# Patient Record
Sex: FEMALE | Race: WHITE | NOT HISPANIC OR LATINO | Employment: OTHER | URBAN - METROPOLITAN AREA
[De-identification: names, ages, dates, MRNs, and addresses within clinical notes are randomized per-mention and may not be internally consistent; named-entity substitution may affect disease eponyms.]

---

## 2017-08-28 ENCOUNTER — TRANSFERRED RECORDS (OUTPATIENT)
Dept: HEALTH INFORMATION MANAGEMENT | Facility: CLINIC | Age: 62
End: 2017-08-28

## 2019-10-18 ENCOUNTER — TRANSFERRED RECORDS (OUTPATIENT)
Dept: HEALTH INFORMATION MANAGEMENT | Facility: CLINIC | Age: 64
End: 2019-10-18

## 2019-10-21 ENCOUNTER — TRANSFERRED RECORDS (OUTPATIENT)
Dept: HEALTH INFORMATION MANAGEMENT | Facility: CLINIC | Age: 64
End: 2019-10-21

## 2019-11-08 ENCOUNTER — TELEPHONE (OUTPATIENT)
Dept: DERMATOLOGY | Facility: CLINIC | Age: 64
End: 2019-11-08

## 2019-11-08 NOTE — TELEPHONE ENCOUNTER
Pt scheduled for Mohs on 11/11 - biopsy records not received yet. Tried to contact the clinic in [place] @ [phone] and it was closed. Derm RN said to have pt keep her appt on 11/11. - Pt advised and appreciative    Jennifer Behrendt  Specialty CSS

## 2019-11-11 ENCOUNTER — OFFICE VISIT (OUTPATIENT)
Dept: DERMATOLOGY | Facility: CLINIC | Age: 64
End: 2019-11-11
Payer: COMMERCIAL

## 2019-11-11 VITALS — DIASTOLIC BLOOD PRESSURE: 65 MMHG | SYSTOLIC BLOOD PRESSURE: 113 MMHG | HEART RATE: 63 BPM | OXYGEN SATURATION: 98 %

## 2019-11-11 DIAGNOSIS — D22.9 NEVUS: ICD-10-CM

## 2019-11-11 DIAGNOSIS — D18.01 ANGIOMA OF SKIN: ICD-10-CM

## 2019-11-11 DIAGNOSIS — D03.59: ICD-10-CM

## 2019-11-11 DIAGNOSIS — L81.4 LENTIGO: Primary | ICD-10-CM

## 2019-11-11 DIAGNOSIS — L82.1 SEBORRHEIC KERATOSIS: ICD-10-CM

## 2019-11-11 PROCEDURE — 13132 CMPLX RPR F/C/C/M/N/AX/G/H/F: CPT | Mod: 51 | Performed by: DERMATOLOGY

## 2019-11-11 PROCEDURE — 88341 IMHCHEM/IMCYTCHM EA ADD ANTB: CPT | Performed by: DERMATOLOGY

## 2019-11-11 PROCEDURE — 17313 MOHS 1 STAGE T/A/L: CPT | Performed by: DERMATOLOGY

## 2019-11-11 PROCEDURE — 99203 OFFICE O/P NEW LOW 30 MIN: CPT | Mod: 25 | Performed by: DERMATOLOGY

## 2019-11-11 PROCEDURE — 88342 IMHCHEM/IMCYTCHM 1ST ANTB: CPT | Mod: 59 | Performed by: DERMATOLOGY

## 2019-11-11 PROCEDURE — 88305 TISSUE EXAM BY PATHOLOGIST: CPT | Mod: TC | Performed by: DERMATOLOGY

## 2019-11-11 NOTE — PROGRESS NOTES
Yordan Enamorado , a 64 year old year old female patient, I was asked to see by Vansant Deepa [facility] for melanoma in situ o nleft buttocks.  Patient states this has been present for one year.  Patient reports the following symptoms:  changing .  Patient reports the following previous treatments none.  Patient reports the following modifying factors none.  Associated symptoms: none.  Patient has no other skin complaints today.  Remainder of the HPI, Meds, PMH, Allergies, FH, and SH was reviewed in chart.      Past Medical History:   Diagnosis Date     Malignant melanoma (H)        History reviewed. No pertinent surgical history.     Family History   Problem Relation Age of Onset     Melanoma No family hx of        Social History     Socioeconomic History     Marital status:      Spouse name: Not on file     Number of children: Not on file     Years of education: Not on file     Highest education level: Not on file   Occupational History     Not on file   Social Needs     Financial resource strain: Not on file     Food insecurity:     Worry: Not on file     Inability: Not on file     Transportation needs:     Medical: Not on file     Non-medical: Not on file   Tobacco Use     Smoking status: Never Smoker     Smokeless tobacco: Never Used   Substance and Sexual Activity     Alcohol use: Not on file     Drug use: Not on file     Sexual activity: Not on file   Lifestyle     Physical activity:     Days per week: Not on file     Minutes per session: Not on file     Stress: Not on file   Relationships     Social connections:     Talks on phone: Not on file     Gets together: Not on file     Attends Oriental orthodox service: Not on file     Active member of club or organization: Not on file     Attends meetings of clubs or organizations: Not on file     Relationship status: Not on file     Intimate partner violence:     Fear of current or ex partner: Not on file     Emotionally abused: Not on file     Physically abused: Not  on file     Forced sexual activity: Not on file   Other Topics Concern     Not on file   Social History Narrative     Not on file       No outpatient encounter medications on file as of 11/11/2019.     No facility-administered encounter medications on file as of 11/11/2019.              Review Of Systems  Skin: As above  Eyes: negative  Ears/Nose/Throat: negative  Respiratory: No shortness of breath, dyspnea on exertion, cough, or hemoptysis  Cardiovascular: negative  Gastrointestinal: negative  Genitourinary: negative  Musculoskeletal: negative  Neurologic: negative  Psychiatric: negative  Hematologic/Lymphatic/Immunologic: negative  Endocrine: negative      O:   NAD, WDWN, Alert & Oriented, Mood & Affect wnl, Vitals stable   Here today alone   /65   Pulse 63   SpO2 98%    General appearance richa ii   Vitals stablke   Alert, oriented and in no acute distress      Following lymph nodes palpated: Occipital, Cervical, Supraclavicular , ingunal no lad   L buttocks 1cm red brown patch    Pigmented macule son trunk and ext with regula obrders and pigment networks    Stuck on papules and brown macules on trunk and ext    Red papules on trunk   Flesh colored papules on trunk      The remainder of the full exam was unremarkable; the following areas were examined:  conjunctiva/lids, oral mucosa, neck, peripheral vascular system, abdomen, lymph nodes, digits/nails, eccrine and apocrine glands, scalp/hair, face, neck, chest, abdomen, buttocks, back, RUE, LUE, RLE, LLE       Eyes: Conjunctivae/lids:Normal     ENT: Lips, buccal mucosa, tongue: normal    MSK:Normal    Cardiovascular: peripheral edema none    Pulm: Breathing Normal    Lymph Nodes: No Head and Neck Lymphadenopathy     Neuro/Psych: Orientation:Normal; Mood/Affect:Normal      A/P:  1. Seborrheic keratosis, lentigo, angioma, dermla nevus, nevi  2. Melanoma ins itu buttocks  MELANOMA DISCUSSED WITH PATIENT:  I discussed the specifics of tumor, prognosis,  metachronous melanoma, self exam, and genetics with the patient. I explained the need for monthly skin exams including and taught the patient how to do this. Patient was asked about new or changing moles . I discussed with patient signs and symptoms that could arise in the setting of recurrent locoregional or metastatic disease. In addition, the need to undergo every 4 month dermatologic full skin survey and evaluation given that patients with a diagnosis of melanoma are at risk of recurrence (local and distant) and of subsequent de hema melanoma.    BENIGN LESIONS DISCUSSED WITH PATIENT:  I discussed the specifics of tumor, prognosis, and genetics of benign lesions.  I explained that treatment of these lesions would be purely cosmetic and not medically neccessary.  I discussed with patient different removal options including excision, cautery and /or laser.      Nature and genetics of benign skin lesions dicussed with patient.  Signs and Symptoms of skin cancer discussed with patient.  ABCDEs of melanoma reviewed with patient.  Patient encouraged to perform monthly skin exams.  UV precautions reviewed with patient.    Skin care regimen reviewed with patient: Eliminate harsh soaps, i.e. Dial, zest, irsih spring; Mild soaps such as Cetaphil or Dove sensitive skin, avoid hot or cold showers, aggressive use of emollients including vanicream, cetaphil or cerave discussed with patient.    Risks of non-melanoma skin cancer discussed with patient   Return to clinic 6 months    PROCEDURE NOTE    l buttocks melanoma insitu   Debulking sent for perms   MELANOMA DISCUSSED WITH PATIENT:  I discussed the specifics of tumor, prognosis, metachronous melanoma, self exam, and genetics with the patient. I explained the need for monthly skin exams including and taught the patient how to do this. Patient was asked about new or changing moles . I discussed with patient signs and symptoms that could arise in the setting of recurrent  locoregional or metastatic disease. In addition, the need to undergo every 4 month dermatologic full skin survey and evaluation given that patients with a diagnosis of melanoma are at risk of recurrence (local and distant) and of subsequent de hema melanoma.  . I reviewed treatment options, including a discussion of wide excision (the gold standard) versus Mohs surgery with MART-1 immunostains.     Note: MART-1 (Melanoma Antigen Recognized by T-cells) antibody immunostaining was used during Mohs surgery as per standard protocol, in addition to routine processing of all specimens with hematoxylin and eosin. The peripheral margins/edges were processed with the MART-1 stain (2 specimens total). The center was examined with hematoxylin & eosin and MART-1 immunostains. The patient was informed of the procedure and its risk/benefits during the consent for the procedure.    One or more of the reagents used in immunohistochemical testing in this case may not have been cleared or approved by the U.S. Food and Drug Administration (FDA). The FDA has determined that such clearance or approval is not necessary. These tests are used for clinical purposes. They should not be regarded as investigational or for research. These reagents  performance characteristics have been determined by [facility]. This laboratory is certified under the Clinical Laboratory Improvement Amendments of 1988 (CLIA-88) as qualified to perform high complexity clinical laboratory testing.    [facility], decision for Mohs surgery was made. Indication for Mohs was aggressive histology. Patient confirmed the site with Dr. Arellano. After anesthesia with LEC, the tumor was excised using standard Mohs technique in 1 stages(s).  MART 1 stains were performed on 2 specimens. CLEAR MARGINS OBTAINED and Final defect size was 1.6 x 2 cm.   REPAIR COMPLEX: Because of the tightness of the surrounding skin and Because of the size  and full thickness nature of the defect, a complex closure was planned. After LEC anesthesia and prep, Burow's triangles were excised in the relaxed skin tension lines. The wound edges were widely undermined by dissection in the subcutaneous plane until adequate tissue mobility was obtained. Hemostasis was obtained. The wound edges were closed in a layered fashion using Vicryl and Fast Absorbing Plain Gut sutures. Postoperative length was 4.3 cm.   EBL minimal; complications none; wound care routine.  The patient was discharged in good condition and will return in one week for wound evaluation.

## 2019-11-11 NOTE — LETTER
11/11/2019         RE: Yordan Enamorado  [address] [address]        Dear Colleague,    Thank you for referring your patient, Yordan Enamorado, to the [facility]. Please see a copy of my visit note below.    Surgical Office Location :   [facility]  [address] [place] [address]      Yordan Enamorado , a 64 year old year old female patient, I was asked to see by [facility] for melanoma in situ o nleft buttocks.  Patient states this has been present for one year.  Patient reports the following symptoms:  changing .  Patient reports the following previous treatments none.  Patient reports the following modifying factors none.  Associated symptoms: none.  Patient has no other skin complaints today.  Remainder of the HPI, Meds, PMH, Allergies, FH, and SH was reviewed in chart.      Past Medical History:   Diagnosis Date     Malignant melanoma (H)        History reviewed. No pertinent surgical history.     Family History   Problem Relation Age of Onset     Melanoma No family hx of        Social History     Socioeconomic History     Marital status:      Spouse name: Not on file     Number of children: Not on file     Years of education: Not on file     Highest education level: Not on file   Occupational History     Not on file   Social Needs     Financial resource strain: Not on file     Food insecurity:     Worry: Not on file     Inability: Not on file     Transportation needs:     Medical: Not on file     Non-medical: Not on file   Tobacco Use     Smoking status: Never Smoker     Smokeless tobacco: Never Used   Substance and Sexual Activity     Alcohol use: Not on file     Drug use: Not on file     Sexual activity: Not on file   Lifestyle     Physical activity:     Days per week: Not on file     Minutes per session: Not on file     Stress: Not on file   Relationships     Social connections:     Talks on phone: Not on file     Gets together:  Not on file     Attends Taoist service: Not on file     Active member of club or organization: Not on file     Attends meetings of clubs or organizations: Not on file     Relationship status: Not on file     Intimate partner violence:     Fear of current or ex partner: Not on file     Emotionally abused: Not on file     Physically abused: Not on file     Forced sexual activity: Not on file   Other Topics Concern     Not on file   Social History Narrative     Not on file       No outpatient encounter medications on file as of 11/11/2019.     No facility-administered encounter medications on file as of 11/11/2019.              Review Of Systems  Skin: As above  Eyes: negative  Ears/Nose/Throat: negative  Respiratory: No shortness of breath, dyspnea on exertion, cough, or hemoptysis  Cardiovascular: negative  Gastrointestinal: negative  Genitourinary: negative  Musculoskeletal: negative  Neurologic: negative  Psychiatric: negative  Hematologic/Lymphatic/Immunologic: negative  Endocrine: negative      O:   NAD, WDWN, Alert & Oriented, Mood & Affect wnl, Vitals stable   Here today alone   /65   Pulse 63   SpO2 98%    General appearance richa ii   Vitals stablke   Alert, oriented and in no acute distress      Following lymph nodes palpated: Occipital, Cervical, Supraclavicular , ingunal no lad   L buttocks 1cm red brown patch    Pigmented macule son trunk and ext with regula obrders and pigment networks    Stuck on papules and brown macules on trunk and ext    Red papules on trunk   Flesh colored papules on trunk      The remainder of the full exam was unremarkable; the following areas were examined:  conjunctiva/lids, oral mucosa, neck, peripheral vascular system, abdomen, lymph nodes, digits/nails, eccrine and apocrine glands, scalp/hair, face, neck, chest, abdomen, buttocks, back, RUE, LUE, RLE, LLE       Eyes: Conjunctivae/lids:Normal     ENT: Lips, buccal mucosa, tongue:  normal    MSK:Normal    Cardiovascular: peripheral edema none    Pulm: Breathing Normal    Lymph Nodes: No Head and Neck Lymphadenopathy     Neuro/Psych: Orientation:Normal; Mood/Affect:Normal      A/P:  1. Seborrheic keratosis, lentigo, angioma, dermla nevus, nevi  2. Melanoma ins itu buttocks  MELANOMA DISCUSSED WITH PATIENT:  I discussed the specifics of tumor, prognosis, metachronous melanoma, self exam, and genetics with the patient. I explained the need for monthly skin exams including and taught the patient how to do this. Patient was asked about new or changing moles . I discussed with patient signs and symptoms that could arise in the setting of recurrent locoregional or metastatic disease. In addition, the need to undergo every 4 month dermatologic full skin survey and evaluation given that patients with a diagnosis of melanoma are at risk of recurrence (local and distant) and of subsequent de hema melanoma.    BENIGN LESIONS DISCUSSED WITH PATIENT:  I discussed the specifics of tumor, prognosis, and genetics of benign lesions.  I explained that treatment of these lesions would be purely cosmetic and not medically neccessary.  I discussed with patient different removal options including excision, cautery and /or laser.      Nature and genetics of benign skin lesions dicussed with patient.  Signs and Symptoms of skin cancer discussed with patient.  ABCDEs of melanoma reviewed with patient.  Patient encouraged to perform monthly skin exams.  UV precautions reviewed with patient.    Skin care regimen reviewed with patient: Eliminate harsh soaps, i.e. Dial, zest, irsih spring; Mild soaps such as Cetaphil or Dove sensitive skin, avoid hot or cold showers, aggressive use of emollients including vanicream, cetaphil or cerave discussed with patient.    Risks of non-melanoma skin cancer discussed with patient   Return to clinic 6 months    PROCEDURE NOTE    l buttocks melanoma insitu   Debulking sent for perms    MELANOMA DISCUSSED WITH PATIENT:  I discussed the specifics of tumor, prognosis, metachronous melanoma, self exam, and genetics with the patient. I explained the need for monthly skin exams including and taught the patient how to do this. Patient was asked about new or changing moles . I discussed with patient signs and symptoms that could arise in the setting of recurrent locoregional or metastatic disease. In addition, the need to undergo every 4 month dermatologic full skin survey and evaluation given that patients with a diagnosis of melanoma are at risk of recurrence (local and distant) and of subsequent de hema melanoma.  . I reviewed treatment options, including a discussion of wide excision (the gold standard) versus Mohs surgery with MART-1 immunostains.     Note: MART-1 (Melanoma Antigen Recognized by T-cells) antibody immunostaining was used during Mohs surgery as per standard protocol, in addition to routine processing of all specimens with hematoxylin and eosin. The peripheral margins/edges were processed with the MART-1 stain (2 specimens total). The center was examined with hematoxylin & eosin and MART-1 immunostains. The patient was informed of the procedure and its risk/benefits during the consent for the procedure.    One or more of the reagents used in immunohistochemical testing in this case may not have been cleared or approved by the U.S. Food and Drug Administration (FDA). The FDA has determined that such clearance or approval is not necessary. These tests are used for clinical purposes. They should not be regarded as investigational or for research. These reagents  performance characteristics have been determined by [facility]. This laboratory is certified under the Clinical Laboratory Improvement Amendments of 1988 (CLIA-88) as qualified to perform high complexity clinical laboratory testing.    After [facility] discussed with patient, decision for Mohs surgery was made.  Indication for Mohs was aggressive histology. Patient confirmed the site with Dr. Arellano. After anesthesia with LEC, the tumor was excised using standard Mohs technique in 1 stages(s).  MART 1 stains were performed on 2 specimens. CLEAR MARGINS OBTAINED and Final defect size was 1.6 x 2 cm.   REPAIR COMPLEX: Because of the tightness of the surrounding skin and Because of the size and full thickness nature of the defect, a complex closure was planned. After LEC anesthesia and prep, Burow's triangles were excised in the relaxed skin tension lines. The wound edges were widely undermined by dissection in the subcutaneous plane until adequate tissue mobility was obtained. Hemostasis was obtained. The wound edges were closed in a layered fashion using Vicryl and Fast Absorbing Plain Gut sutures. Postoperative length was 4.3 cm.   EBL minimal; complications none; wound care routine.  The patient was discharged in good condition and will return in one week for wound evaluation.      Again, thank you for allowing me to participate in the care of your patient.        Sincerely,        Kurt Arellano MD

## 2019-11-11 NOTE — NURSING NOTE
Initial /65   Pulse 63   SpO2 98%  There is no height or weight on file to calculate BMI. .    Eliza Stephens LPN

## 2019-11-11 NOTE — PATIENT INSTRUCTIONS
Sutured Wound Care     [facility]: [phone]    [facility]: [phone]          ? No strenuous activity for 48 hours. Resume moderate activity in 48 hours. No heavy exercising until you are seen for follow up in one week.     ? Take Tylenol as needed for discomfort.                         ? Do not drink alcoholic beverages for 48 hours.     ? Keep the pressure bandage in place for 24 hours. If the bandage becomes blood tinged or loose, reinforce it with gauze and tape.        (Refer to the reverse side of this page for management of bleeding).    ? Remove pressure bandage in 24 hours     ? Leave the flat bandage in place until your follow up appointment.    ? Keep the bandage dry. Wash around it carefully.    ? If the tape becomes soiled or starts to come off, reinforce it with additional paper tape.    ? Do not smoke for 3 weeks; smoking is detrimental to wound healing.    ? It is normal to have swelling and bruising around the surgical site. The bruising will fade in approximately 10-14 days. Elevate the area to reduce swelling.    ? Numbness, itchiness and sensitivity to temperature changes can occur after surgery and may take up to 18 months to normalize.      POSSIBLE COMPLICATIONS    BLEEDIN. Leave the bandage in place.  2. Use tightly rolled up gauze or a cloth to apply direct pressure over the bandage for 20   minutes.  3. Reapply pressure for an additional 20 minutes if necessary  4. Call the office or go to the nearest emergency room if pressure fails to stop the bleeding.  5. Use additional gauze and tape to maintain pressure once the bleeding has stopped.        PAIN:    1. Post operative pain should slowly get better, never worse.  2. A severe increase in pain may indicate a problem. Call the office if this occurs.    In case of emergency phone:Dr Arellano [phone]        WOUND CARE INSTRUCTIONS  for  ONE WEEK AFTER SURGERY       Change bandage on    blot with  water and apply steri strips and tape    1) Leave bandage on until 11/18  2) 11/25 when you remove the bandage, you may resume your regular skin care routine, including washing with mild soap and water, applying moisturizer, make-up and sunscreen.    3) If there are any open or bleeding areas at the incision/graft site you should begin to cover the area with a bandage daily as follows:    1) Clean and dry the area with plain tap water using a Q-tip or sterile gauze pad.  2) Apply Polysporin or Bacitracin ointment to the open area.  3) Cover the wound with a band-aid or a sterile non-stick gauze pad and micropore paper tape.         SIGNS OF INFECTION  - If you notice any of these signs of infection, call your doctor right away: expanding redness around the wound.  - Yellow or greenish-colored pus or cloudy wound drainage.    - Red streaking spreading from the wound.  - Increased swelling, tenderness, or pain around the wound.   - Fever.    Please remember that yellow and clear drainage from a wound can be normal and related to normal wound healing.  Isolated drainage from a wound without a combination of the above features does not indicate infection.       *Once the bandages are removed, the scar will be red and firm (especially in the lip/chin area). This is normal and will fade in time. It might take 6-12 months for this to happen.     *Massaging the area will help the scar soften and fade quicker. Begin to massage the area one month after the bandages have been removed. To massage apply pressure directly and firmly over the scar with the fingertips and move in a circular motion. Massage the area for a few minutes several times a day. Continue to massage the site for several months.    *Approximately 6-8 weeks after surgery it is not uncommon to see the formation of  tender pimple-like  bump along the scar. This is normal. As the scar continues to mature and the stitches underneath the skin begin to dissolve,  this might occur. Do not pick or squeeze, this will resolve on it s own. Should one break open producing a small amount of drainage, apply Polysporin or Bacitracin ointment a few times a day until the wound is completely healed.    *Numbness in the surgical area is expected. It might take 12-18 months for the feeling to return to normal. During this time sensations of itchiness, tingling and occasional sharp pains might be noted. These feelings are normal and will subside once the nerves have completely healed.         IN CASE OF EMERGENCY: Dr Arellano [phone]     If you were seen in [place] call: [phone]    If you were seen in [place] call: [phone]

## 2019-11-14 LAB — COPATH REPORT: NORMAL

## 2021-08-30 ENCOUNTER — TELEPHONE (OUTPATIENT)
Dept: DERMATOLOGY | Facility: CLINIC | Age: 66
End: 2021-08-30

## 2021-08-30 NOTE — TELEPHONE ENCOUNTER
Letter sent out to patient's home re: cancelled appt 10/14/2021 with Dr. Kurt Arellano. Asking patient to call clinic to reschedule.    Thank you  Shannan Alfred

## 2021-10-11 ENCOUNTER — ANCILLARY PROCEDURE (OUTPATIENT)
Dept: MAMMOGRAPHY | Facility: CLINIC | Age: 66
End: 2021-10-11
Attending: FAMILY MEDICINE
Payer: COMMERCIAL

## 2021-10-11 ENCOUNTER — OFFICE VISIT (OUTPATIENT)
Dept: FAMILY MEDICINE | Facility: CLINIC | Age: 66
End: 2021-10-11
Payer: COMMERCIAL

## 2021-10-11 VITALS
RESPIRATION RATE: 16 BRPM | DIASTOLIC BLOOD PRESSURE: 65 MMHG | BODY MASS INDEX: 20.09 KG/M2 | SYSTOLIC BLOOD PRESSURE: 119 MMHG | WEIGHT: 125 LBS | HEART RATE: 45 BPM | TEMPERATURE: 96.5 F | HEIGHT: 66 IN | OXYGEN SATURATION: 100 %

## 2021-10-11 DIAGNOSIS — Z78.0 ASYMPTOMATIC MENOPAUSAL STATE: ICD-10-CM

## 2021-10-11 DIAGNOSIS — Z12.11 COLON CANCER SCREENING: ICD-10-CM

## 2021-10-11 DIAGNOSIS — L85.3 DRY SKIN: ICD-10-CM

## 2021-10-11 DIAGNOSIS — J34.89 NASAL SORE: ICD-10-CM

## 2021-10-11 DIAGNOSIS — Z12.31 ENCOUNTER FOR SCREENING MAMMOGRAM FOR MALIGNANT NEOPLASM OF BREAST: ICD-10-CM

## 2021-10-11 DIAGNOSIS — G25.9 ABNORMAL LEG MOVEMENT: ICD-10-CM

## 2021-10-11 DIAGNOSIS — R19.7 DIARRHEA OF PRESUMED INFECTIOUS ORIGIN: ICD-10-CM

## 2021-10-11 DIAGNOSIS — Z00.00 MEDICARE ANNUAL WELLNESS VISIT, SUBSEQUENT: Primary | ICD-10-CM

## 2021-10-11 DIAGNOSIS — L91.0 KELOID SCAR: ICD-10-CM

## 2021-10-11 DIAGNOSIS — K13.79 MOUTH SORE: ICD-10-CM

## 2021-10-11 LAB
ALBUMIN SERPL-MCNC: 4 G/DL (ref 3.5–5)
ALP SERPL-CCNC: 65 U/L (ref 45–120)
ALT SERPL W P-5'-P-CCNC: 17 U/L (ref 0–45)
ANION GAP SERPL CALCULATED.3IONS-SCNC: 10 MMOL/L (ref 5–18)
AST SERPL W P-5'-P-CCNC: 22 U/L (ref 0–40)
BASOPHILS # BLD AUTO: 0 10E3/UL (ref 0–0.2)
BASOPHILS NFR BLD AUTO: 1 %
BILIRUB SERPL-MCNC: 0.5 MG/DL (ref 0–1)
BUN SERPL-MCNC: 12 MG/DL (ref 8–22)
CALCIUM SERPL-MCNC: 9.9 MG/DL (ref 8.5–10.5)
CHLORIDE BLD-SCNC: 103 MMOL/L (ref 98–107)
CO2 SERPL-SCNC: 28 MMOL/L (ref 22–31)
CREAT SERPL-MCNC: 0.79 MG/DL (ref 0.6–1.1)
EOSINOPHIL # BLD AUTO: 0.2 10E3/UL (ref 0–0.7)
EOSINOPHIL NFR BLD AUTO: 3 %
ERYTHROCYTE [DISTWIDTH] IN BLOOD BY AUTOMATED COUNT: 12.4 % (ref 10–15)
FERRITIN SERPL-MCNC: 81 NG/ML (ref 10–130)
GFR SERPL CREATININE-BSD FRML MDRD: 79 ML/MIN/1.73M2
GLUCOSE BLD-MCNC: 88 MG/DL (ref 70–125)
HCT VFR BLD AUTO: 39.4 % (ref 35–47)
HGB BLD-MCNC: 12.6 G/DL (ref 11.7–15.7)
IMM GRANULOCYTES # BLD: 0 10E3/UL
IMM GRANULOCYTES NFR BLD: 0 %
LYMPHOCYTES # BLD AUTO: 1.4 10E3/UL (ref 0.8–5.3)
LYMPHOCYTES NFR BLD AUTO: 19 %
MAGNESIUM SERPL-MCNC: 2.1 MG/DL (ref 1.8–2.6)
MCH RBC QN AUTO: 31.5 PG (ref 26.5–33)
MCHC RBC AUTO-ENTMCNC: 32 G/DL (ref 31.5–36.5)
MCV RBC AUTO: 99 FL (ref 78–100)
MONOCYTES # BLD AUTO: 0.6 10E3/UL (ref 0–1.3)
MONOCYTES NFR BLD AUTO: 8 %
NEUTROPHILS # BLD AUTO: 4.9 10E3/UL (ref 1.6–8.3)
NEUTROPHILS NFR BLD AUTO: 68 %
PLATELET # BLD AUTO: 218 10E3/UL (ref 150–450)
POTASSIUM BLD-SCNC: 4.4 MMOL/L (ref 3.5–5)
PROT SERPL-MCNC: 7.6 G/DL (ref 6–8)
RBC # BLD AUTO: 4 10E6/UL (ref 3.8–5.2)
SODIUM SERPL-SCNC: 141 MMOL/L (ref 136–145)
TSH SERPL DL<=0.005 MIU/L-ACNC: 1.77 UIU/ML (ref 0.3–5)
VIT B12 SERPL-MCNC: 928 PG/ML (ref 213–816)
WBC # BLD AUTO: 7.2 10E3/UL (ref 4–11)

## 2021-10-11 PROCEDURE — 83735 ASSAY OF MAGNESIUM: CPT | Performed by: FAMILY MEDICINE

## 2021-10-11 PROCEDURE — 82607 VITAMIN B-12: CPT | Performed by: FAMILY MEDICINE

## 2021-10-11 PROCEDURE — 99213 OFFICE O/P EST LOW 20 MIN: CPT | Mod: 25 | Performed by: FAMILY MEDICINE

## 2021-10-11 PROCEDURE — 77067 SCR MAMMO BI INCL CAD: CPT | Mod: TC | Performed by: RADIOLOGY

## 2021-10-11 PROCEDURE — 87186 SC STD MICRODIL/AGAR DIL: CPT | Performed by: FAMILY MEDICINE

## 2021-10-11 PROCEDURE — 82306 VITAMIN D 25 HYDROXY: CPT | Performed by: FAMILY MEDICINE

## 2021-10-11 PROCEDURE — 36415 COLL VENOUS BLD VENIPUNCTURE: CPT | Performed by: FAMILY MEDICINE

## 2021-10-11 PROCEDURE — 87070 CULTURE OTHR SPECIMN AEROBIC: CPT | Performed by: FAMILY MEDICINE

## 2021-10-11 PROCEDURE — 99397 PER PM REEVAL EST PAT 65+ YR: CPT | Performed by: FAMILY MEDICINE

## 2021-10-11 PROCEDURE — 82728 ASSAY OF FERRITIN: CPT | Performed by: FAMILY MEDICINE

## 2021-10-11 PROCEDURE — 80050 GENERAL HEALTH PANEL: CPT | Performed by: FAMILY MEDICINE

## 2021-10-11 PROCEDURE — 87077 CULTURE AEROBIC IDENTIFY: CPT | Performed by: FAMILY MEDICINE

## 2021-10-11 RX ORDER — MUPIROCIN 20 MG/G
OINTMENT TOPICAL 3 TIMES DAILY
Qty: 30 G | Refills: 0 | Status: SHIPPED | OUTPATIENT
Start: 2021-10-11 | End: 2023-04-24

## 2021-10-11 ASSESSMENT — ACTIVITIES OF DAILY LIVING (ADL): CURRENT_FUNCTION: NO ASSISTANCE NEEDED

## 2021-10-11 ASSESSMENT — MIFFLIN-ST. JEOR: SCORE: 1124.75

## 2021-10-11 NOTE — PATIENT INSTRUCTIONS
astroglide for lubrication    Try antibiotic ointment to nostrils bid for 14 days      Patient Education   Personalized Prevention Plan  You are due for the preventive services outlined below.  Your care team is available to assist you in scheduling these services.  If you have already completed any of these items, please share that information with your care team to update in your medical record.  There are no preventive care reminders to display for this patient.    Signs of Hearing Loss      Hearing much better with one ear can be a sign of hearing loss.   Hearing loss is a problem shared by many people. In fact, it is one of the most common health problems, particularly as people age. Most people age 65 and older have some hearing loss. By age 80, almost everyone does. Hearing loss often occurs slowly over the years. So you may not realize your hearing has gotten worse.  Have your hearing checked  Call your healthcare provider if you:    Have to strain to hear normal conversation    Have to watch other people s faces very carefully to follow what they re saying    Need to ask people to repeat what they ve said    Often misunderstand what people are saying    Turn the volume of the television or radio up so high that others complain    Feel that people are mumbling when they re talking to you    Find that the effort to hear leaves you feeling tired and irritated    Notice, when using the phone, that you hear better with one ear than the other  [employer] last reviewed this educational content on 1/1/2020 2000-2021 [employer]. All rights reserved. This information is not intended as a substitute for professional medical care. Always follow your healthcare professional's instructions.

## 2021-10-11 NOTE — PROGRESS NOTES
SUBJECTIVE:   Yordan Enamorado is a 65 year old female who presents for Preventive Visit.    [facility] Health Maintenance Exam  [facility]      Assessment/Plan     1. Annual Wellness Visit as outlined below.   Health maintenance discussed as appropriate for age and risk factors including:  Nutrition, exercise, alcohol use, tobacco use, safe sexual practices, dental health.  Cancer screening assessed and ordered as indicated. Routine labs as outlined below based on age and risk factors reviewed today. Immunizations reviewed and updated.       Medicare annual wellness visit, subsequent  - Adult Eye Referral    Nasal sore  Culture first and trial of mupirocin for 2 weeks.  If not better consider ENT consultation.    - CBC with platelets and differential  - mupirocin (BACTROBAN) 2 % external ointment  Dispense: 30 g; Refill: 0  - Skin Aerobic Bacterial Culture Routine  - CBC with platelets and differential    Mouth sore  Stable for 6 months.  Check nutritional status below.  Consider a b complex.  Follow-up with dentist vs ENT.      Abnormal leg movement  Possibly RLS.  Check nutritional deficiencies below.  Reviewed hydration and gentle exercise/stretching before bed.  Consider consultation with sleep clinic vs neurology next.    - CBC with platelets and differential  - Ferritin  - Vitamin B12  - Comprehensive metabolic panel (BMP + Alb, Alk Phos, ALT, AST, Total. Bili, TP)  - Magnesium  - CBC with platelets and differential  - Ferritin  - Vitamin B12  - Comprehensive metabolic panel (BMP + Alb, Alk Phos, ALT, AST, Total. Bili, TP)  - Magnesium    Asymptomatic menopausal state  - Vitamin D Deficiency  - DX Hip/Pelvis/Spine  - Vitamin D Deficiency    Colon cancer screening  - COLOGUARD(EXACT SCIENCES)    Dry skin  - TSH with free T4 reflex  - TSH with free T4 reflex    Diarrhea of presumed infectious origin  - Ova and Parasite Exam Routine  - Ova and Parasite Exam Routine  - Ova and Parasite Exam  Routine    Encounter for screening mammogram for malignant neoplasm of breast  - *MA Screening Digital Bilateral    Keloid scar  Follow-up with derm as desired.         Return in about 53 weeks (around 10/17/2022) for Annual Wellness Visit.    HPI:     Chief Complaint   Patient presents with     Physical       Yordan MARVIN Enamorado is a 65 year old female and is here for a comprehensive health maintenance exam.     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 At first thought it was peanut allergies it got better off peanuts but then came back again.  Vaseline seems to help short term.  Has had this for years.  At [place] told that it could be cleared up but it would return.  Has never been swabbed/cultured before.      Sore in mouth- thinks it is from biting the mouth and never healing.  Has been there about 6 months now.  Slowly getting better but never goes away.      RLS- better with bananas.  Legs are constantly moving and makes it hard to sleep.  If she doesn't move her legs she feels like she is going to loose it. Doesn't get much dairy in diet.  Does take D regularly.  Does take b12 at times.      Dry skin - has had a difficulty with this her entire life.  Often itches to the point of bleeding.  Can't shave her legs.  Takes cool showers.  Has a good cream to use.      Worried about amoebas/worms based on recent travel and living situation.  Has had 2 bouts of diarrhea x2 in past several months.  Has had ameobas in the past and had diarrhea on and off.  Last deworming was 10 years ago.  Tends to eat adventurously.      H/o melanoma and has follow-up with dermatology scheduled.  Scar is itchy and menezes.  No radiation or topical chemo    sinovac covid shots 4/28/21 and 5/26/21   Declines flu shot     History summarized from1-2:na  Old Records-1: Outside  "allergies, meds, problems and immunizations were reconciled as needed from [facility]where  Requested records from [facility] in [place]   Radiology tests reviewed-1: na  Lab tests reviewed-1: na  Medicine tests reviewed-1: na    Review of Systems   Complete ROS including General, HEENT, CV, Pulmonary, GI, , Genital, Neuro, Psych, MSK, Heme, Endocrine all within normal except as noted in the HPI.      Prevention of Osteoporosis:limited dairy   Last Dexa:due     Cancer Screening:  Hemoccults/Colonoscopy: cologard    Mammogram: due today  Pap Smear:  NA  Lung Cancer: na      Wt Readings from Last 3 Encounters:   10/11/21 56.7 kg (125 lb)         Patient has been advised of split billing requirements and indicates understanding: Yes   Are you in the first 12 months of your Medicare coverage?  Yes,  Visual Acuity:  Right Eye: 20/40   Left Eye: 20/50      Healthy Habits:     In general, how would you rate your overall health?  Good    Frequency of exercise:  6-7 days/week    Duration of exercise:  45-60 minutes    Do you usually eat at least 4 servings of fruit and vegetables a day, include whole grains    & fiber and avoid regularly eating high fat or \"junk\" foods?  Yes    Taking medications regularly:  Yes    Medication side effects:  None    Ability to successfully perform activities of daily living:  No assistance needed    Home Safety:  No safety concerns identified    Hearing Impairment:  Feel that people are mumbling or not speaking clearly    In the past 6 months, have you been bothered by leaking of urine?  No    In general, how would you rate your overall mental or emotional health?  Good      PHQ-2 Total Score: 0    Additional concerns today:  Yes    Do you feel safe in your environment? YES    Have you ever done Advance Care Planning? (For example, a Health Directive, POLST, or a discussion with a medical provider or your loved ones about your wishes): No, advance care planning information given to patient to " review.  Patient plans to discuss their wishes with loved ones or provider.       Fall risk  Fallen 2 or more times in the past year?: No  Any fall with injury in the past year?: No    Cognitive Screening   1) Repeat 3 items (Leader, Season, Table)    2) Clock draw: NORMAL  3) 3 item recall: Recalls 3 objects  Results: 3 items recalled: COGNITIVE IMPAIRMENT LESS LIKELY    Mini-CogTM Copyright ROXI Kaufman. Licensed by the author for use in [facility]; reprinted with permission (gena@[facility]). All rights reserved.      Do you have sleep apnea, excessive snoring or daytime drowsiness?: no    Reviewed and updated as needed this visit by clinical staff  Tobacco  Allergies  Meds  Problems  Med Hx  Surg Hx  Fam Hx  Soc Hx          Reviewed and updated as needed this visit by Provider  Tobacco   Meds  Problems  Med Hx  Surg Hx  Fam Hx  Soc Hx       Social History     Tobacco Use     Smoking status: Never Smoker     Smokeless tobacco: Never Used   Substance Use Topics     Alcohol use: Never         Alcohol Use 10/11/2021   Prescreen: >3 drinks/day or >7 drinks/week? No       Current providers sharing in care for this patient include:   Patient Care Team:  Malathi Singh MD as PCP - General (Family Medicine)  No Ref-Primary, Physician    The following health maintenance items are reviewed in Epic and correct as of today:  Health Maintenance Due   Topic Date Due     ANNUAL REVIEW OF  ORDERS  Never done     ADVANCE CARE PLANNING  Never done     COLORECTAL CANCER SCREENING  Never done     COVID-19 Vaccine (1) Never done     HIV SCREENING  Never done     HEPATITIS C SCREENING  Never done     DTAP/TDAP/TD IMMUNIZATION (1 - Tdap) Never done     LIPID  Never done     ZOSTER IMMUNIZATION (1 of 2) Never done     Pneumococcal Vaccine: 65+ Years (1 of 1 - PPSV23) Never done     INFLUENZA VACCINE (1) Never done         Any new diagnosis of family breast, ovarian, or bowel cancer? no    FHS-7:   Breast CA Risk  "Assessment (FHS-7) 10/11/2021   Did any of your first-degree relatives have breast or ovarian cancer? No   Did any of your relatives have bilateral breast cancer? Unknown   Did any man in your family have breast cancer? No   Did any woman in your family have breast and ovarian cancer? No   Did any woman in your family have breast cancer before age 50 y? No   Do you have 2 or more relatives with breast and/or ovarian cancer? No   Do you have 2 or more relatives with breast and/or bowel cancer? No       Pertinent mammograms are reviewed under the imaging tab.    Review of Systems      OBJECTIVE:   /65 (BP Location: Right arm, Patient Position: Sitting, Cuff Size: Adult Regular)   Pulse (!) 45   Temp (!) 96.5  F (35.8  C) (Temporal)   Resp 16   Ht 1.67 m (5' 5.75\")   Wt 56.7 kg (125 lb)   SpO2 100%   BMI 20.33 kg/m   Estimated body mass index is 20.33 kg/m  as calculated from the following:    Height as of this encounter: 1.67 m (5' 5.75\").    Weight as of this encounter: 56.7 kg (125 lb).  Physical Exam  All normal as below except abnormalities include: crusting in both nostril- no bleeding or redness/swelling,   General is a  65 year old female sitting comfortably in no apparent distress.   HEENT:  TM are clear bilaterally.  Eye, nasal, oral exams within normal   Neck: Supple without lymphadenopathy or thyromegally  CV: Regular rate and rhythm S1S2 without rubs, murmurs or gallops,   Lungs: Clear to auscultation bilaterally  Abd:  +BS, soft NT/ND,  No masses or organomegally,   Extremities: Warm, No Edema, 2+ Pedal and radial pulses bilaterally  Skin: No lesions or rashes noted  Neuro: Able to ambulate around the exam room with equal movement, strength and normal coordination of the upper and lower extremeties symmetrically      ASSESSMENT / PLAN:       Patient has been advised of split billing requirements and indicates understanding: yes  COUNSELING:      Estimated body mass index is 20.33 kg/m  as " "calculated from the following:    Height as of this encounter: 1.67 m (5' 5.75\").    Weight as of this encounter: 56.7 kg (125 lb).        She reports that she has never smoked. She has never used smokeless tobacco.      Appropriate preventive services were discussed with this patient, including applicable screening as appropriate for cardiovascular disease, diabetes, osteopenia/osteoporosis, and glaucoma.  As appropriate for age/gender, discussed screening for colorectal cancer, prostate cancer, breast cancer, and cervical cancer. Checklist reviewing preventive services available has been given to the patient.    Reviewed patients plan of care and provided an AVS. The Basic Care Plan (routine screening as documented in Health Maintenance) for Yordan meets the Care Plan requirement. This Care Plan has been established and reviewed with the Patient.    Counseling Resources:  ATP IV Guidelines  Pooled Cohorts Equation Calculator  Breast Cancer Risk Calculator  Breast Cancer: Medication to Reduce Risk  FRAX Risk Assessment  ICSI Preventive Guidelines  Dietary Guidelines for Americans, 2010  USDA's MyPlate  ASA Prophylaxis  Lung CA Screening    Malathi Singh MD  [facility]    Identified Health Risks:    The patient was provided with written information regarding signs of hearing loss.  "

## 2021-10-12 LAB — DEPRECATED CALCIDIOL+CALCIFEROL SERPL-MC: 33 UG/L (ref 30–80)

## 2021-10-13 LAB
BACTERIA SKIN AEROBE CULT: ABNORMAL
BACTERIA SKIN AEROBE CULT: ABNORMAL

## 2021-10-18 ENCOUNTER — TRANSFERRED RECORDS (OUTPATIENT)
Dept: HEALTH INFORMATION MANAGEMENT | Facility: CLINIC | Age: 66
End: 2021-10-18

## 2021-10-18 LAB — COLOGUARD-ABSTRACT: NEGATIVE

## 2021-10-20 ENCOUNTER — ANCILLARY PROCEDURE (OUTPATIENT)
Dept: BONE DENSITY | Facility: CLINIC | Age: 66
End: 2021-10-20
Attending: FAMILY MEDICINE
Payer: COMMERCIAL

## 2021-10-20 DIAGNOSIS — Z78.0 ASYMPTOMATIC MENOPAUSAL STATE: ICD-10-CM

## 2021-10-20 PROCEDURE — 77080 DXA BONE DENSITY AXIAL: CPT | Mod: TC | Performed by: RADIOLOGY

## 2021-10-21 PROBLEM — M85.89 OSTEOPENIA OF MULTIPLE SITES: Status: ACTIVE | Noted: 2021-10-21

## 2021-10-22 ENCOUNTER — E-VISIT (OUTPATIENT)
Dept: FAMILY MEDICINE | Facility: CLINIC | Age: 66
End: 2021-10-22
Payer: COMMERCIAL

## 2021-10-22 DIAGNOSIS — G25.81 RESTLESS LEGS SYNDROME (RLS): Primary | ICD-10-CM

## 2021-10-22 PROCEDURE — 99207 PR NON-BILLABLE SERV PER CHARTING: CPT | Performed by: FAMILY MEDICINE

## 2021-10-27 ENCOUNTER — MYC MEDICAL ADVICE (OUTPATIENT)
Dept: FAMILY MEDICINE | Facility: CLINIC | Age: 66
End: 2021-10-27

## 2021-10-27 PROCEDURE — 87177 OVA AND PARASITES SMEARS: CPT

## 2021-10-27 PROCEDURE — 87209 SMEAR COMPLEX STAIN: CPT

## 2021-10-28 ENCOUNTER — ANCILLARY PROCEDURE (OUTPATIENT)
Dept: MAMMOGRAPHY | Facility: CLINIC | Age: 66
End: 2021-10-28
Attending: FAMILY MEDICINE
Payer: COMMERCIAL

## 2021-10-28 DIAGNOSIS — N64.89 BREAST ASYMMETRY: ICD-10-CM

## 2021-10-28 PROCEDURE — 87177 OVA AND PARASITES SMEARS: CPT

## 2021-10-28 PROCEDURE — 87209 SMEAR COMPLEX STAIN: CPT

## 2021-10-28 PROCEDURE — 77061 BREAST TOMOSYNTHESIS UNI: CPT | Mod: RT

## 2021-10-29 ENCOUNTER — LAB (OUTPATIENT)
Dept: LAB | Facility: CLINIC | Age: 66
End: 2021-10-29
Payer: COMMERCIAL

## 2021-10-29 DIAGNOSIS — R19.7 DIARRHEA OF PRESUMED INFECTIOUS ORIGIN: ICD-10-CM

## 2021-10-29 PROCEDURE — 87209 SMEAR COMPLEX STAIN: CPT

## 2021-10-29 PROCEDURE — 87177 OVA AND PARASITES SMEARS: CPT

## 2021-11-01 ENCOUNTER — MYC MEDICAL ADVICE (OUTPATIENT)
Dept: FAMILY MEDICINE | Facility: CLINIC | Age: 66
End: 2021-11-01
Payer: MEDICARE

## 2021-11-01 DIAGNOSIS — Z23 IMMUNIZATION DUE: Primary | ICD-10-CM

## 2021-11-01 DIAGNOSIS — D03.9 MELANOMA IN SITU, UNSPECIFIED SITE (H): Primary | ICD-10-CM

## 2021-11-01 LAB
O+P STL MICRO: ABNORMAL

## 2021-11-01 NOTE — TELEPHONE ENCOUNTER
Derm referral made:  Diagnoses and all orders for this visit:    Melanoma in situ, unspecified site (H)  -     Adult Dermatology Referral; Future

## 2021-11-11 DIAGNOSIS — J34.89 NASAL SORE: Primary | ICD-10-CM

## 2021-11-18 ENCOUNTER — TELEPHONE (OUTPATIENT)
Dept: DERMATOLOGY | Facility: CLINIC | Age: 66
End: 2021-11-18
Payer: MEDICARE

## 2021-11-18 NOTE — TELEPHONE ENCOUNTER
[facility] Call Center    Phone Message    May a detailed message be left on voicemail: no VM, MyC is ok    Reason for Call: Appointment Intake    Referring Provider Name:   Pt of Dr Arellano    Diagnosis and/or Symptoms:   Skin Check    Action Taken: Other: [facility]    Travel Screening: Not Applicable     Pt is leaving [facility] on 12/15/21 and has unknown return. Pt wants to see Dr Arellano before leaving.    Scheduling let Pt know that no openings are available throughout system. Pt requests message to clinic to see if there is anything clinic can do to schedule.     Sending Duplicate  TE to [facility].    Please call Pt to advise. Thanks!

## 2021-11-18 NOTE — TELEPHONE ENCOUNTER
[facility] Call Center    Phone Message    May a detailed message be left on voicemail: no VM, MyC is ok    Reason for Call: Appointment Intake    Referring Provider Name:   Pt of Dr Arellano    Diagnosis and/or Symptoms:   Skin Check    Action Taken: Other: [facility]    Travel Screening: Not Applicable     Pt is leaving [facility] on 12/15/21 and has unknown return. Pt wants to see Dr Arellano before leaving.    Scheduling let Pt know that no openings are available throughout system. Pt requests message to clinic to see if there is anything clinic can do to schedule.     Sending Duplicate  TE to [facility].    Please call Pt to advise. Thanks!

## 2021-11-18 NOTE — TELEPHONE ENCOUNTER
Called and spoke to patient.    Informed patient no availability prior to scheduled appointment on 1/13/22.    RN added patient to wait list.    Patient voiced understanding.    Judith RN-BSN-PHN  [facility]  [phone]

## 2021-11-19 NOTE — TELEPHONE ENCOUNTER
Sinovac is an inactivated virus Covid-19 vaccine. She received her doses on 4/28/21 and 5/26/21.    I recommend: Moderna BOOSTER. Due 11/26/21 or later.  Diagnoses and all orders for this visit:    Immunization due  -     COVID-19,PF,MODERNA (18+ YRS BOOSTER .25ML); Future

## 2021-11-20 ENCOUNTER — HEALTH MAINTENANCE LETTER (OUTPATIENT)
Age: 66
End: 2021-11-20

## 2021-11-22 ENCOUNTER — OFFICE VISIT (OUTPATIENT)
Dept: DERMATOLOGY | Facility: CLINIC | Age: 66
End: 2021-11-22
Payer: COMMERCIAL

## 2021-11-22 DIAGNOSIS — L82.1 SEBORRHEIC KERATOSIS: ICD-10-CM

## 2021-11-22 DIAGNOSIS — L91.0 HYPERTROPHIC SCAR: Primary | ICD-10-CM

## 2021-11-22 DIAGNOSIS — L85.3 XEROSIS CUTIS: ICD-10-CM

## 2021-11-22 DIAGNOSIS — L57.8 SUN-DAMAGED SKIN: ICD-10-CM

## 2021-11-22 DIAGNOSIS — D03.9 MELANOMA IN SITU, UNSPECIFIED SITE (H): ICD-10-CM

## 2021-11-22 DIAGNOSIS — D22.9 MULTIPLE BENIGN NEVI: ICD-10-CM

## 2021-11-22 DIAGNOSIS — L81.4 SOLAR LENTIGO: ICD-10-CM

## 2021-11-22 DIAGNOSIS — D18.01 CHERRY ANGIOMA: ICD-10-CM

## 2021-11-22 PROCEDURE — 11900 INJECT SKIN LESIONS </W 7: CPT | Performed by: DERMATOLOGY

## 2021-11-22 PROCEDURE — 99213 OFFICE O/P EST LOW 20 MIN: CPT | Mod: 25 | Performed by: DERMATOLOGY

## 2021-11-22 RX ORDER — TRETINOIN 0.25 MG/G
CREAM TOPICAL
Qty: 45 G | Refills: 11 | Status: SHIPPED | OUTPATIENT
Start: 2021-11-22 | End: 2023-04-24

## 2021-11-22 RX ORDER — TRETINOIN 0.25 MG/G
CREAM TOPICAL
Status: CANCELLED | OUTPATIENT
Start: 2021-11-22

## 2021-11-22 NOTE — NURSING NOTE
Clinic Administered Medication Documentation    Administrations This Visit     triamcinolone acetonide (KENALOG-10) injection 10 mg     Admin Date  11/22/2021 Action  Given Dose  10 mg Route  Intra-Lesional Site   Administered By  Rosita Hernandez LPN    Ordering Provider: Paul Wade MD    Patient Supplied?: No              Rosita Hernandez LPN

## 2021-11-22 NOTE — PROGRESS NOTES
[facility] Dermatology Note  Encounter Date: Nov 22, 2021  Office Visit     Dermatology Problem List:  Last skin check 11/22/21, recommended yearly  1. MIS, left buttocks, s/p MMS 11/11/19 with Dr. Arellano  - ILK 10mg/mL 11/22/21  ____________________________________________    Assessment & Plan:    # MIS, left buttocks. No evidence of recurrence. Discussed risk of melanoma recurrence (with local or distant disease) and risk of additional primary melanomas. Explained routine schedule for follow up examinations in office and need for self skin checks monthly.  - ABCDs of melanoma were discussed and self skin checks were advised.   - Sun precaution was advised including the use of sunscreens of SPF 30 or higher, sun protective clothing, and avoidance of tanning beds.  - Recommended yearly dental, gyn, and ophthalmology examinations.  - Recommended first degree relatives get yearly skin exams as well.    # Hypertrophic / keloidal scar, L buttocks. In setting of prior melanoma surgery. No evidence of recurrence.   - Recommended ILK for hypertrophic scar on the left buttocks at previous MIS site. See ILK procedure note below.    # Benign lesions: Multiple benign nevi, solar lentigos, seborrheic keratoses, cherry angiomas. Explained to patient benign nature of lesion. No treatment is necessary at this time unless the lesion changes or becomes symptomatic.   - ABCDs of melanoma were discussed and self skin checks were advised.  - Sun precaution was advised including the use of sun screens of SPF 30 or higher, sun protective clothing, and avoidance of tanning beds.    # Xerosis cutis.  - Recommended soaking in tub (recipe given for bleach bath), pat dry, then follow with CeraVe moisturizer.   - Also discussed Curel hydrotherapy to apply when skin is wet.    # Solar lentigines, face.   - Prescribed tretinoin 0.025% cream. Apply a thin layer over the entire face, avoiding the eyelids. Moisturize at top.  Reviewed this may not be covered by insurance. Discussed GoodRx for coupon.  - Diligent use of sunscreen.     #Benign lesions: Multiple benign nevi, solar lentigos, seborrheic keratoses, cherry angiomas. Explained to patient benign nature of lesion. No treatment is necessary at this time unless the lesion changes or becomes symptomatic.     - ABCDs of melanoma were discussed and self skin checks were advised.  - Sun precaution was advised including the use of sun screens of SPF 30 or higher, sun protective clothing, and avoidance of tanning beds.     Procedures Performed:   - Intra-lesional triamcinolone procedure note. After positioning and cleansing with isopropyl alcohol, 0.4 total mL of triamcinolone 10 mg/mL was injected into 2 lesions on the left buttocks. The patient tolerated the procedure well and left the dermatology clinic in good condition.    Follow-up: 1 year in-person for skin check, or earlier for new or changing lesions.    Staff and Scribe:     Scribe Disclosure:   I, Jessica Edward, am serving as a scribe to document services personally performed by this physician, Dr. Paul Wade, based on data collection and the provider's statements to me.    Provider Disclosure:   The documentation recorded by the scribe accurately reflects the services I personally performed and the decisions made by me.    Paul Wade MD    Department of Dermatology  [facility]: Phone: [phone], Fax:[phone]  [facility]: Phone: [phone] Fax: [phone]  ____________________________________________    CC: Skin Check (Full body skin check. No spots of concern. Personal history of melanoma in [place]. No family history of skin cancer.)    HPI:  Ms. Yordan Enamorado is a(n) 66 year old female who presents today as a return patient for skin check.    Last seen 11/11/19 by Dr. Arellano. [facility]  was performed on an MIS on the left buttocks.    Referred by PCP for MIS on 11/1/21. Patient sent [facility] message requesting a referral for a dermatologist.    Today, Yordan notes the scar on the left buttocks is sore and itchy. She also is wondering about good moisturizers and treatment for dark spots on the face.    The patient otherwise denies any new or concerning lesions. No bleeding, painful, pruritic, or changing lesions. They report a history of MIS on the right buttocks. No other skin cancers. There is no family history of skin cancer. No history of immunosuppression. No history of indoor tanning. They do use sunscreen and protective clothing when outdoors for sun protection. Takes Vitamin D supplements. No occupational exposure to ultraviolet light or other forms of radiation. 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 Health otherwise stable. No other skin concerns.     Denies any fevers, chills, night sweats, or weight loss.    Labs Reviewed:  N/A    Physical Exam:  Vitals: There were no vitals taken for this visit.  SKIN: Total skin excluding the undergarment areas was performed. The exam included the head/face, neck, both arms, chest, back, abdomen, buttocks, both legs, digits and/or nails. Bra was worn for exam.  - Brown macules on sun exposed areas  - Brown waxy, stuck-on appearing papules on face, trunk, and extremities.   - Brown macules and papules on trunk and extremities without concerning dermoscopy features  - Red vascular papules on face, trunk and extremities.   - Firm, smooth, linear, hypertrophic scar on the left buttocks.  - No other lesions of concern on areas examined.   LYMPH NODES: No submandibular, cervical, supraclavicular, axillary, or inguinal lymphadenopathy palpable on examination.     Medications:  Current Outpatient Medications   Medication     tretinoin (RETIN-A) 0.025 % external cream     vitamin B complex with  vitamin C (VITAMIN  B COMPLEX) tablet     VITAMIN D PO     VITAMIN E PO     mupirocin (BACTROBAN) 2 % external ointment     Current Facility-Administered Medications   Medication     triamcinolone acetonide (KENALOG-10) injection 10 mg      Past Medical History:   Patient Active Problem List   Diagnosis     Nasal sore     Mouth sore     Abnormal leg movement     Keloid scar     Osteopenia of multiple sites     Past Medical History:   Diagnosis Date     Malignant melanoma (H)      Melanoma of skin (H)     left buttocks        CC Rina Joiner MD  [address] [place], [place] [phone] on close of this encounter.

## 2021-11-22 NOTE — PATIENT INSTRUCTIONS
"For moisturizer:    The moisturizer we discussed today to apply to the skin when wet is called \"Curel Hydrotherapy\". It is best to do this right after showering/bathing. If you would like, you can apply 1/2 cup of fragrance-free bleach with a bathtub of water.        For the dark spots on face:    - At night, apply a pea sized amount of tretinoin 0.025% on the whole face beginning with every third night for 2 weeks, then every other night for 2 weeks, then every night as tolerated. Moisturize on top.     - If this is not covered by insurance, you can try \"GoodRx\".        Intralesional Kenalog (ILK) Injections    Intralesional steroid injection involves a corticosteroid, such as triamcinolone acetonide (brand name Kenalog), which is injected directly into a lesion on or immediately below the skin. Intralesional kenalog may be used to treat the following skin conditions:      Alopecia areata    Discoid lupus erythematosus    Keloids/hypertrophic scars    Granuloma annulare and other granulomatous disorders    Hypertrophic lichen planus    Lichen simplex chronicus (neurodermatitis)    Localised psoriasis    Necrobiosis lipoidica    Acne cysts (nodulocystic acne)    Small infantile hemangiomas    Possible side-effects of intralesional Kenalog (ILK) injections include bleeding, pain, infection, contact dermatitis, nerve damage, scar formation and need for a repeat procedure.    Some people may experience delayed side-effects including:    Lipoatrophy, appearing as skin indentations or dimples around the injection sites a few weeks after treatment; these may be permanent.    White marks (leukoderma) or brown marks (postinflammatory pigmentation) at the site of injection or spreading from the site of injection - these may resolve or persist long term.    Telangiectasia, or small dilated blood vessels at the site of injection.     Increased hair growth at the site of injection - this resolves eventually.    Steroid acne: " steroids increase growth hormone, leading to increased sebum (oil) production by the sebaceous glands. Steroid acne generally improves once the steroid has been stopped.      Who should I call with questions?    [facility]: [phone]     [facility]: [phone]    For urgent needs outside of business hours call the [facility] at [phone] and ask for the dermatology resident on call            Patient Education     Checking for Skin Cancer  You can find cancer early by checking your skin each month. There are 3 kinds of skin cancer. They are melanoma, basal cell carcinoma, and squamous cell carcinoma. Doing monthly skin checks is the best way to find new marks or skin changes. Follow the instructions below for checking your skin.   The ABCDEs of checking moles for melanoma   Check your moles or growths for signs of melanoma using ABCDE:     Asymmetry: the sides of the mole or growth don t match    Border: the edges are ragged, notched, or blurred    Color: the color within the mole or growth varies    Diameter: the mole or growth is larger than 6 mm (size of a pencil eraser)    Evolving: the size, shape, or color of the mole or growth is changing (evolving is not shown in the images below)    Checking for other types of skin cancer  Basal cell carcinoma or squamous cell carcinoma have symptoms such as:       A spot or mole that looks different from all other marks on your skin    Changes in how an area feels, such as itching, tenderness, or pain    Changes in the skin's surface, such as oozing, bleeding, or scaliness    A sore that does not heal    New swelling or redness beyond the border of a mole    Who s at risk?  Anyone can get skin cancer. But you are at greater risk if you have:     Fair skin, light-colored hair, or light-colored eyes    Many moles or abnormal moles on your skin    A history of sunburns from sunlight or tanning  beds    A family history of skin cancer    A history of exposure to radiation or chemicals    A weakened immune system  If you have had skin cancer in the past, you are at risk for recurring skin cancer.   How to check your skin  Do your monthly skin checkups in front of a full-length mirror. Check all parts of your body, including your:     Head (ears, face, neck, and scalp)    Torso (front, back, and sides)    Arms (tops, undersides, upper, and lower armpits)    Hands (palms, backs, and fingers, including under the nails)    Buttocks and genitals    Legs (front, back, and sides)    Feet (tops, soles, toes, including under the nails, and between toes)  If you have a lot of moles, take digital photos of them each month. Make sure to take photos both up close and from a distance. These can help you see if any moles change over time.   Most skin changes are not cancer. But if you see any changes in your skin, call your doctor right away. Only he or she can diagnose a problem. If you have skin cancer, seeing your doctor can be the first step toward getting the treatment that could save your life.   [employer] last reviewed this educational content on 4/1/2019 2000-2020 [employer]. [address] [place] [address], [place] [address]. All rights reserved. This information is not intended as a substitute for professional medical care. Always follow your healthcare professional's instructions.       When should I call my doctor?    If you are worsening or not improving, please, contact us or seek urgent care as noted below.     Who should I call with questions (adults)?    [facility] (adult and pediatric): [phone]    [facility] (adult): [phone]    For urgent needs outside of business hours call the [facility] at [phone] and ask for the dermatology resident on call to be paged    If this is a medical emergency and you are unable to  reach an ER, Call [phone]    Who should I call with questions (pediatric)?  [facility]- Pediatric Dermatology  Dr. Frances Edwards, Dr. Kaity Dugan, Dr. Eleonora Connolly, OTONIEL Jason, Dr. Kandis Sierra, Dr. Chanel Fung & Dr. Michael Dunlap  Non-urgent nurse triage line; [phone]- Betsey and Lenore BLANCO Care Coordinators   Allegra (/Complex ) [phone]    If you need a prescription refill, please contact your pharmacy. Refills are approved or denied by our Physicians during normal business hours, Monday through Fridays  Per office policy, refills will not be granted if you have not been seen within the past year (or sooner depending on your child's condition)    Scheduling Information:  Pediatric Appointment Scheduling and Call Center [phone]  Radiology Scheduling- [phone]  Sedation Unit Scheduling- [phone]  [facility] Scheduling- [facility] [phone]; Pediatric Dermatology [phone]  Main  Services: [phone]  Malaysian: [phone]  [place] citizen: [phone]  Hmong/Frisian/Rob: [phone]  Preadmission Nursing Department Fax Number: [phone] (Fax all pre-operative paperwork to this number)    For urgent matters arising during evenings, weekends, or holidays that cannot wait for normal business hours please call [phone] and ask for the dermatology resident on call to be paged.

## 2021-11-22 NOTE — LETTER
11/22/2021         RE: Yordan Enamorado  [address] [facility] [address]        Dear Colleague,    Thank you for referring your patient, Yordan Enamorado, to the [facility]. Please see a copy of my visit note below.    [facility] Dermatology Note  Encounter Date: Nov 22, 2021  Office Visit     Dermatology Problem List:  Last skin check 11/22/21, recommended yearly  1. MIS, left buttocks, s/p MMS 11/11/19 with Dr. Arellano  - ILK 10mg/mL 11/22/21  ____________________________________________    Assessment & Plan:    # MIS, left buttocks. No evidence of recurrence. Discussed risk of melanoma recurrence (with local or distant disease) and risk of additional primary melanomas. Explained routine schedule for follow up examinations in office and need for self skin checks monthly.  - ABCDs of melanoma were discussed and self skin checks were advised.   - Sun precaution was advised including the use of sunscreens of SPF 30 or higher, sun protective clothing, and avoidance of tanning beds.  - Recommended yearly dental, gyn, and ophthalmology examinations.  - Recommended first degree relatives get yearly skin exams as well.    # Hypertrophic / keloidal scar, L buttocks. In setting of prior melanoma surgery. No evidence of recurrence.   - Recommended ILK for hypertrophic scar on the left buttocks at previous MIS site. See ILK procedure note below.    # Benign lesions: Multiple benign nevi, solar lentigos, seborrheic keratoses, cherry angiomas. Explained to patient benign nature of lesion. No treatment is necessary at this time unless the lesion changes or becomes symptomatic.   - ABCDs of melanoma were discussed and self skin checks were advised.  - Sun precaution was advised including the use of sun screens of SPF 30 or higher, sun protective clothing, and avoidance of tanning beds.    # Xerosis cutis.  - Recommended soaking in tub (recipe given for bleach bath), pat  dry, then follow with CeraVe moisturizer.   - Also discussed Curel hydrotherapy to apply when skin is wet.    # Solar lentigines, face.   - Prescribed tretinoin 0.025% cream. Apply a thin layer over the entire face, avoiding the eyelids. Moisturize at top. Reviewed this may not be covered by insurance. Discussed GoodRx for coupon.  - Diligent use of sunscreen.     #Benign lesions: Multiple benign nevi, solar lentigos, seborrheic keratoses, cherry angiomas. Explained to patient benign nature of lesion. No treatment is necessary at this time unless the lesion changes or becomes symptomatic.     - ABCDs of melanoma were discussed and self skin checks were advised.  - Sun precaution was advised including the use of sun screens of SPF 30 or higher, sun protective clothing, and avoidance of tanning beds.     Procedures Performed:   - Intra-lesional triamcinolone procedure note. After positioning and cleansing with isopropyl alcohol, 0.4 total mL of triamcinolone 10 mg/mL was injected into 2 lesions on the left buttocks. The patient tolerated the procedure well and left the dermatology clinic in good condition.    Follow-up: 1 year in-person for skin check, or earlier for new or changing lesions.    Staff and Scribe:     Scribe Disclosure:   I, Jessica Edward, am serving as a scribe to document services personally performed by this physician, Dr. Paul Wade, based on data collection and the provider's statements to me.    Provider Disclosure:   The documentation recorded by the scribe accurately reflects the services I personally performed and the decisions made by me.    Paul Wade MD    Department of Dermatology  [facility]: Phone: [phone], Fax:[phone]  [facility]: Phone: [phone] Fax: [phone]  ____________________________________________    CC: Skin Check (Full body  skin check. No spots of concern. Personal history of melanoma in [place]. No family history of skin cancer.)    HPI:  Ms. Yordan Enamorado is a(n) 66 year old female who presents today as a return patient for skin check.    Last seen 11/11/19 by Dr. Arellano. MMS was performed on an MIS on the left buttocks.    Referred by PCP for MIS on 11/1/21. Patient sent [employer] message requesting a referral for a dermatologist.    Today, Yordan notes the scar on the left buttocks is sore and itchy. She also is wondering about good moisturizers and treatment for dark spots on the face.    The patient otherwise denies any new or concerning lesions. No bleeding, painful, pruritic, or changing lesions. They report a history of MIS on the right buttocks. No other skin cancers. There is no family history of skin cancer. No history of immunosuppression. No history of indoor tanning. They do use sunscreen and protective clothing when outdoors for sun protection. Takes Vitamin D supplements. No occupational exposure to ultraviolet light or other forms of radiation. 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 Health otherwise stable. No other skin concerns.     Denies any fevers, chills, night sweats, or weight loss.    Labs Reviewed:  N/A    Physical Exam:  Vitals: There were no vitals taken for this visit.  SKIN: Total skin excluding the undergarment areas was performed. The exam included the head/face, neck, both arms, chest, back, abdomen, buttocks, both legs, digits and/or nails. Bra was worn for exam.  - Brown macules on sun exposed areas  - Brown waxy, stuck-on appearing papules on face, trunk, and extremities.   - Brown macules and papules on trunk and extremities without concerning dermoscopy features  - Red vascular papules on face, trunk and extremities.   - Firm, smooth, linear, hypertrophic scar on the left buttocks.  - No other lesions of concern on areas examined.    LYMPH NODES: No submandibular, cervical, supraclavicular, axillary, or inguinal lymphadenopathy palpable on examination.     Medications:  Current Outpatient Medications   Medication     tretinoin (RETIN-A) 0.025 % external cream     vitamin B complex with vitamin C (VITAMIN  B COMPLEX) tablet     VITAMIN D PO     VITAMIN E PO     mupirocin (BACTROBAN) 2 % external ointment     Current Facility-Administered Medications   Medication     triamcinolone acetonide (KENALOG-10) injection 10 mg      Past Medical History:   Patient Active Problem List   Diagnosis     Nasal sore     Mouth sore     Abnormal leg movement     Keloid scar     Osteopenia of multiple sites     Past Medical History:   Diagnosis Date     Malignant melanoma (H)      Melanoma of skin (H)     left buttocks        CC Rina Joiner MD  [address] [place] [address] on close of this encounter.      Again, thank you for allowing me to participate in the care of your patient.        Sincerely,        Paul Wade MD

## 2021-11-22 NOTE — NURSING NOTE
Yordan Enamorado's goals for this visit include:   Chief Complaint   Patient presents with     Skin Check     Full body skin check. No spots of concern. Personal history of melanoma in [place]. No family history of skin cancer.       She requests these members of her care team be copied on today's visit information:     PCP: Malathi Singh    Referring Provider:  Rina Joiner MD  [address] [place] [address]    There were no vitals taken for this visit.    Do you need any medication refills at today's visit? Tabatha Langston CMA

## 2021-11-29 ENCOUNTER — TELEPHONE (OUTPATIENT)
Dept: DERMATOLOGY | Facility: CLINIC | Age: 66
End: 2021-11-29
Payer: MEDICARE

## 2021-11-29 NOTE — TELEPHONE ENCOUNTER
PA Initiation    Medication: tretinoin (RETIN-A) 0.025 % external cream   Insurance Company: Other (see comments)  Pharmacy Filling the Rx: [id number] [place], [place] - [address] [employer] [employer]  Filling Pharmacy Phone: [phone]  Filling Pharmacy Fax: [phone]  Start Date: 11/29/2021

## 2021-12-02 NOTE — TELEPHONE ENCOUNTER
PRIOR AUTHORIZATION DENIED    Medication: tretinoin (RETIN-A) 0.025 % external cream--DENIED    Denial Date: 11/29/2021    Denial Rational: Patient is using for a cosmetic reason.  Cosmetic medications are excluded

## 2021-12-02 NOTE — TELEPHONE ENCOUNTER
Attempted to call patient, no answer. Unable to leave voice message.     Please inform patient of medication denial and [employer] to get a cheaper talamantes.    Chantel Granger LPN

## 2021-12-03 NOTE — TELEPHONE ENCOUNTER
I spoke with Yordan and notified her of the denial.  She was aware and used the [employer] coupon.  Estelle Landis RN

## 2021-12-08 ENCOUNTER — IMMUNIZATION (OUTPATIENT)
Dept: NURSING | Facility: CLINIC | Age: 66
End: 2021-12-08
Payer: COMMERCIAL

## 2021-12-08 PROCEDURE — 91300 PR COVID VAC PFIZER DIL RECON 30 MCG/0.3 ML IM: CPT

## 2021-12-08 PROCEDURE — 0004A PR COVID VAC PFIZER DIL RECON 30 MCG/0.3 ML IM: CPT

## 2021-12-13 ENCOUNTER — OFFICE VISIT (OUTPATIENT)
Dept: DERMATOLOGY | Facility: CLINIC | Age: 66
End: 2021-12-13
Payer: COMMERCIAL

## 2021-12-13 VITALS — HEART RATE: 72 BPM | DIASTOLIC BLOOD PRESSURE: 65 MMHG | OXYGEN SATURATION: 98 % | SYSTOLIC BLOOD PRESSURE: 114 MMHG

## 2021-12-13 DIAGNOSIS — L91.0 HYPERTROPHIC SCAR: Primary | ICD-10-CM

## 2021-12-13 DIAGNOSIS — L81.4 LENTIGO: ICD-10-CM

## 2021-12-13 DIAGNOSIS — Z86.006 HISTORY OF MELANOMA IN SITU: ICD-10-CM

## 2021-12-13 DIAGNOSIS — L82.1 SEBORRHEIC KERATOSIS: ICD-10-CM

## 2021-12-13 DIAGNOSIS — D23.9 DERMAL NEVUS: ICD-10-CM

## 2021-12-13 DIAGNOSIS — D18.01 ANGIOMA OF SKIN: ICD-10-CM

## 2021-12-13 PROCEDURE — 99213 OFFICE O/P EST LOW 20 MIN: CPT | Performed by: DERMATOLOGY

## 2021-12-13 NOTE — NURSING NOTE
Chief Complaint   Patient presents with     Skin Check     no concerns       Vitals:    12/13/21 0920   BP: 114/65   BP Location: Right arm   Patient Position: Sitting   Cuff Size: Adult Regular   Pulse: 72   SpO2: 98%     Wt Readings from Last 1 Encounters:   10/11/21 56.7 kg (125 lb)       Jacinda Pool LPN .................12/13/2021

## 2021-12-13 NOTE — LETTER
2021         RE: Yordan Enamorado  [address]  [facility] [address]        Dear Colleague,    Thank you for referring your patient, Yordan Enamorado, to the [facility]. Please see a copy of my visit note below.    Yordan Enamorado is an extremely pleasant 66 year old year old female patient here today for hx of melanoma in situ.  SCar is a little itchy but doing ok.  She denies any new or changing skin lesions.  Patient has no other skin complaints today.  Remainder of the HPI, Meds, PMH, Allergies, FH, and SH was reviewed in chart.      Past Medical History:   Diagnosis Date     Malignant melanoma (H)      Melanoma of skin (H)     left buttocks       Past Surgical History:   Procedure Laterality Date     BREAST EXCISIONAL BIOPSY      all benign        Family History   Problem Relation Age of Onset     Cancer Mother         Lymph nodes     Other - See Comments Father         tobacco related death     Pulmonary Embolism Sister      Myocardial Infarction Sister         tobacco related     Other - See Comments Brother          in [place] war     Melanoma No family hx of        Social History     Socioeconomic History     Marital status:      Spouse name: Yehuda     Number of children: 3     Years of education: Not on file     Highest education level: Not on file   Occupational History     Occupation: missionary- Retired   Tobacco Use     Smoking status: Never Smoker     Smokeless tobacco: Never Used   Vaping Use     Vaping Use: Never used   Substance and Sexual Activity     Alcohol use: Never     Drug use: Never     Sexual activity: Yes     Partners: Male     Birth control/protection: Post-menopausal   Other Topics Concern     Not on file   Social History Narrative    ** Merged History Encounter **         Lives with Yehuda     Social Determinants of Health     Financial Resource Strain: Not on file   Food Insecurity: Not on file   Transportation Needs: Not  on file   Physical Activity: Not on file   Stress: Not on file   Social Connections: Not on file   Intimate Partner Violence: Not on file   Housing Stability: Not on file       Outpatient Encounter Medications as of 12/13/2021   Medication Sig Dispense Refill     mupirocin (BACTROBAN) 2 % external ointment Apply topically 3 times daily Apply to nares bid for 14 days (Patient not taking: Reported on 11/22/2021) 30 g 0     tretinoin (RETIN-A) 0.025 % external cream Apply a pea-sized amount evenly over the face at nighttime before bed 45 g 11     vitamin B complex with vitamin C (VITAMIN  B COMPLEX) tablet Take 1 tablet by mouth daily       VITAMIN D PO Take by mouth daily       VITAMIN E PO Take by mouth daily       No facility-administered encounter medications on file as of 12/13/2021.             O:   NAD, WDWN, Alert & Oriented, Mood & Affect wnl, Vitals stable   Here today with     /65 (BP Location: Right arm, Patient Position: Sitting, Cuff Size: Adult Regular)   Pulse 72   SpO2 98%    General appearance normal   Vitals stable   Alert, oriented and in no acute distress      Following lymph nodes palpated: Occipital, Cervical, Supraclavicular , inguinal no lad   L buttocks firm scar      Stuck on papules and brown macules on trunk and ext   Red papules on trunk  Flesh colored papules on trunk     The remainder of the full exam was normal; the following areas were examined:  conjunctiva/lids, oral mucosa, neck, peripheral vascular system, abdomen, lymph nodes, digits/nails, eccrine and apocrine glands, scalp/hair, face, neck, chest, abdomen, buttocks, back, RUE, LUE, RLE, LLE       Eyes: Conjunctivae/lids:Normal     ENT: Lips, buccal mucosa, tongue: normal    MSK:Normal    Cardiovascular: peripheral edema none    Pulm: Breathing Normal    Lymph Nodes: No Head and Neck Lymphadenopathy     Neuro/Psych: Orientation:Alert and Orientedx3 ; Mood/Affect:normal       A/P:  1. Seborrheic keratosis, lentigo,  angioma, dermal nevus, hx of melanoma ins itu   2. Hypertrophic scar  Il tac discussed with patient she declines  Massage discussed with patient   It was a pleasure speaking to Yordan Enamorado today.  Previous clinic notes and pertinent laboratory tests were reviewed prior to Yordan Enamorado's visit.  Nature and genetics of benign skin lesions dicussed with patient.  Signs and Symptoms of skin cancer discussed with patient.  Patient encouraged to perform monthly skin exams.  UV precautions reviewed with patient.  Patient to follow up with Primary Care provider regarding elevated blood pressure.  Skin care regimen reviewed with patient: Eliminate harsh soaps, i.e. Dial, zest, irsih spring; Mild soaps such as Cetaphil or Dove sensitive skin, avoid hot or cold showers, aggressive use of emollients including vanicream, cetaphil or cerave discussed with patient.    Risk of melanoma discussed with patient   Return to clinic 12 months        Again, thank you for allowing me to participate in the care of your patient.        Sincerely,        Kurt Arellano MD

## 2021-12-13 NOTE — PROGRESS NOTES
Yordan Enamorado is an extremely pleasant 66 year old year old female patient here today for hx of melanoma in situ.  SCar is a little itchy but doing ok.  She denies any new or changing skin lesions.  Patient has no other skin complaints today.  Remainder of the HPI, Meds, PMH, Allergies, FH, and SH was reviewed in chart.      Past Medical History:   Diagnosis Date     Malignant melanoma (H)      Melanoma of skin (H)     left buttocks       Past Surgical History:   Procedure Laterality Date     BREAST EXCISIONAL BIOPSY      all benign        Family History   Problem Relation Age of Onset     Cancer Mother         Lymph nodes     Other - See Comments Father         tobacco related death     Pulmonary Embolism Sister      Myocardial Infarction Sister         tobacco related     Other - See Comments Brother          in [place] war     Melanoma No family hx of        Social History     Socioeconomic History     Marital status:      Spouse name: Yehuda     Number of children: 3     Years of education: Not on file     Highest education level: Not on file   Occupational History     Occupation: missionary- Retired   Tobacco Use     Smoking status: Never Smoker     Smokeless tobacco: Never Used   Vaping Use     Vaping Use: Never used   Substance and Sexual Activity     Alcohol use: Never     Drug use: Never     Sexual activity: Yes     Partners: Male     Birth control/protection: Post-menopausal   Other Topics Concern     Not on file   Social History Narrative    ** Merged History Encounter **         Lives with Yehuda     Social Determinants of Health     Financial Resource Strain: Not on file   Food Insecurity: Not on file   Transportation Needs: Not on file   Physical Activity: Not on file   Stress: Not on file   Social Connections: Not on file   Intimate Partner Violence: Not on file   Housing Stability: Not on file       Outpatient Encounter Medications as of 2021   Medication Sig Dispense  Refill     mupirocin (BACTROBAN) 2 % external ointment Apply topically 3 times daily Apply to nares bid for 14 days (Patient not taking: Reported on 11/22/2021) 30 g 0     tretinoin (RETIN-A) 0.025 % external cream Apply a pea-sized amount evenly over the face at nighttime before bed 45 g 11     vitamin B complex with vitamin C (VITAMIN  B COMPLEX) tablet Take 1 tablet by mouth daily       VITAMIN D PO Take by mouth daily       VITAMIN E PO Take by mouth daily       No facility-administered encounter medications on file as of 12/13/2021.             O:   NAD, WDWN, Alert & Oriented, Mood & Affect wnl, Vitals stable   Here today with     /65 (BP Location: Right arm, Patient Position: Sitting, Cuff Size: Adult Regular)   Pulse 72   SpO2 98%    General appearance normal   Vitals stable   Alert, oriented and in no acute distress      Following lymph nodes palpated: Occipital, Cervical, Supraclavicular , inguinal no lad   L buttocks firm scar      Stuck on papules and brown macules on trunk and ext   Red papules on trunk  Flesh colored papules on trunk     The remainder of the full exam was normal; the following areas were examined:  conjunctiva/lids, oral mucosa, neck, peripheral vascular system, abdomen, lymph nodes, digits/nails, eccrine and apocrine glands, scalp/hair, face, neck, chest, abdomen, buttocks, back, RUE, LUE, RLE, LLE       Eyes: Conjunctivae/lids:Normal     ENT: Lips, buccal mucosa, tongue: normal    MSK:Normal    Cardiovascular: peripheral edema none    Pulm: Breathing Normal    Lymph Nodes: No Head and Neck Lymphadenopathy     Neuro/Psych: Orientation:Alert and Orientedx3 ; Mood/Affect:normal       A/P:  1. Seborrheic keratosis, lentigo, angioma, dermal nevus, hx of melanoma ins itu   2. Hypertrophic scar  Il tac discussed with patient she declines  Massage discussed with patient   It was a pleasure speaking to Yordan Enamorado today.  Previous clinic notes and pertinent laboratory tests  were reviewed prior to Yordan Enamorado's visit.  Nature and genetics of benign skin lesions dicussed with patient.  Signs and Symptoms of skin cancer discussed with patient.  Patient encouraged to perform monthly skin exams.  UV precautions reviewed with patient.  Patient to follow up with Primary Care provider regarding elevated blood pressure.  Skin care regimen reviewed with patient: Eliminate harsh soaps, i.e. Dial, zest, irsih spring; Mild soaps such as Cetaphil or Dove sensitive skin, avoid hot or cold showers, aggressive use of emollients including vanicream, cetaphil or cerave discussed with patient.    Risk of melanoma discussed with patient   Return to clinic 12 months

## 2022-01-17 ENCOUNTER — LAB (OUTPATIENT)
Dept: URGENT CARE | Facility: URGENT CARE | Age: 67
End: 2022-01-17
Payer: MEDICARE

## 2022-01-17 DIAGNOSIS — Z20.822 ENCOUNTER FOR LABORATORY TESTING FOR COVID-19 VIRUS: ICD-10-CM

## 2022-01-17 PROCEDURE — U0005 INFEC AGEN DETEC AMPLI PROBE: HCPCS | Performed by: FAMILY MEDICINE

## 2022-01-18 LAB — SARS-COV-2 RNA RESP QL NAA+PROBE: NOT DETECTED

## 2022-06-24 ENCOUNTER — TELEPHONE (OUTPATIENT)
Dept: DERMATOLOGY | Facility: CLINIC | Age: 67
End: 2022-06-24

## 2022-06-24 NOTE — TELEPHONE ENCOUNTER
Return in about 1 year (around 11/22/2022) for Skin Check    [facility]   Dermatology, General and Plastic Surgery, Urology Specialties   [facility] [address] [address]

## 2022-12-26 ENCOUNTER — HEALTH MAINTENANCE LETTER (OUTPATIENT)
Age: 67
End: 2022-12-26

## 2023-03-08 ENCOUNTER — HOSPITAL ENCOUNTER (OUTPATIENT)
Dept: MAMMOGRAPHY | Facility: CLINIC | Age: 68
Discharge: HOME | End: 2023-03-08
Attending: FAMILY MEDICINE | Admitting: FAMILY MEDICINE
Payer: MEDICARE

## 2023-03-08 DIAGNOSIS — Z12.31 SCREENING MAMMOGRAM FOR BREAST CANCER: ICD-10-CM

## 2023-03-08 PROCEDURE — 77067 SCR MAMMO BI INCL CAD: CPT

## 2023-03-16 ENCOUNTER — OFFICE VISIT (OUTPATIENT)
Dept: DERMATOLOGY | Facility: CLINIC | Age: 68
End: 2023-03-16
Payer: MEDICARE

## 2023-03-16 DIAGNOSIS — L82.1 SEBORRHEIC KERATOSIS: ICD-10-CM

## 2023-03-16 DIAGNOSIS — D18.01 ANGIOMA OF SKIN: ICD-10-CM

## 2023-03-16 DIAGNOSIS — D22.9 NEVUS: ICD-10-CM

## 2023-03-16 DIAGNOSIS — L81.4 LENTIGO: Primary | ICD-10-CM

## 2023-03-16 DIAGNOSIS — L81.6 POIKILODERMA: ICD-10-CM

## 2023-03-16 DIAGNOSIS — D23.9 DERMAL NEVUS: ICD-10-CM

## 2023-03-16 DIAGNOSIS — Z86.006 HISTORY OF MELANOMA IN SITU: ICD-10-CM

## 2023-03-16 PROCEDURE — 99213 OFFICE O/P EST LOW 20 MIN: CPT | Performed by: DERMATOLOGY

## 2023-03-16 RX ORDER — DOXYCYCLINE 50 MG/1
50 CAPSULE ORAL 2 TIMES DAILY
Qty: 60 CAPSULE | Refills: 6 | Status: SHIPPED | OUTPATIENT
Start: 2023-03-16 | End: 2023-03-16

## 2023-03-16 ASSESSMENT — PAIN SCALES - GENERAL: PAINLEVEL: NO PAIN (0)

## 2023-03-16 NOTE — LETTER
3/16/2023         RE: Yordan Enamorado  [address]  [place] [address]        Dear Colleague,    Thank you for referring your patient, Yordan Enamorado, to the [facility]. Please see a copy of my visit note below.    Yordan Enamorado is an extremely pleasant 67 year old year old female patient here today for hx of melanoma in situ.  He notes pimple son trunk.   .   Patient states this has been present for a while.  Patient reports the following symptoms:  pimples.  Patient reports the following previous treatments none.  These treatments did not work.  Patient reports the following modifying factors none.  Associated symptoms: none.  Patient has no other skin complaints today.  Remainder of the HPI, Meds, PMH, Allergies, FH, and SH was reviewed in chart.      Past Medical History:   Diagnosis Date     Malignant melanoma (H)      Melanoma of skin (H)     left buttocks       Past Surgical History:   Procedure Laterality Date     BREAST EXCISIONAL BIOPSY      all benign        Family History   Problem Relation Age of Onset     Cancer Mother         Lymph nodes     Other - See Comments Father         tobacco related death     Pulmonary Embolism Sister      Myocardial Infarction Sister         tobacco related     Other - See Comments Brother          in [place] war     Melanoma No family hx of        Social History     Socioeconomic History     Marital status:      Spouse name: Yehuda     Number of children: 3     Years of education: Not on file     Highest education level: Not on file   Occupational History     Occupation: missionary- Retired   Tobacco Use     Smoking status: Never     Smokeless tobacco: Never   Vaping Use     Vaping Use: Never used   Substance and Sexual Activity     Alcohol use: Never     Drug use: Never     Sexual activity: Yes     Partners: Male     Birth control/protection: Post-menopausal   Other Topics Concern     Not on file   Social History  Narrative    ** Merged History Encounter **         Lives with Yehuda     Social Determinants of Health     Financial Resource Strain: Not on file   Food Insecurity: Not on file   Transportation Needs: Not on file   Physical Activity: Not on file   Stress: Not on file   Social Connections: Not on file   Intimate Partner Violence: Not on file   Housing Stability: Not on file       Outpatient Encounter Medications as of 3/16/2023   Medication Sig Dispense Refill     vitamin B complex with vitamin C (STRESS TAB) tablet Take 1 tablet by mouth daily       VITAMIN D PO Take by mouth daily       VITAMIN E PO Take by mouth daily       mupirocin (BACTROBAN) 2 % external ointment Apply topically 3 times daily Apply to nares bid for 14 days (Patient not taking: Reported on 11/22/2021) 30 g 0     tretinoin (RETIN-A) 0.025 % external cream Apply a pea-sized amount evenly over the face at nighttime before bed 45 g 11     No facility-administered encounter medications on file as of 3/16/2023.             O:   NAD, WDWN, Alert & Oriented, Mood & Affect wnl, Vitals stable   Here today alone   General appearance normal   Vitals stable   Alert, oriented and in no acute distress      Following lymph nodes palpated: Occipital, Cervical, Supraclavicular , axilla no lad  Inflammatory papules on chest     Stuck on papules and brown macules on trunk and ext   Red papules on trunk  Flesh colored papules on trunk     The remainder of the full exam was normal; the following areas were examined:  conjunctiva/lids, oral mucosa, neck, peripheral vascular system, abdomen, lymph nodes, digits/nails, eccrine and apocrine glands, scalp/hair, face, neck, chest, abdomen, buttocks, back, RUE, LUE, RLE, LLE       Eyes: Conjunctivae/lids:Normal     ENT: Lips, buccal mucosa, tongue: normal    MSK:Normal    Cardiovascular: peripheral edema none    Pulm: Breathing Normal    Lymph Nodes: No Head and Neck Lymphadenopathy     Neuro/Psych:  Orientation:Alert and Orientedx3 ; Mood/Affect:normal       A/P:  1. Seborrheic keratosis, lentigo, angioma, dermal nevus, hx of melanoma in situ   2. Folliculitis  Topicals and orals discussed with patient   Side effects of Doxycycline: sun sensitivity, stomach upset, dizziness and heartburn. If you experience a sudden onset of rash or severe unusual headache, discontinue the medication and contact your clinician immediately.     Doxy twice daily  Return to clinic 3 months  It was a pleasure speaking to Yordan Enamorado today.  Previous clinic notes and pertinent laboratory tests were reviewed prior to Yordan Enamorado's visit.  Signs and Symptoms of skin cancer discussed with patient.  Patient encouraged to perform monthly skin exams.  UV precautions reviewed with patient.  Return to clinic 3 months        Again, thank you for allowing me to participate in the care of your patient.        Sincerely,        Kurt Arellano MD

## 2023-03-16 NOTE — PROGRESS NOTES
Yordan Enamorado is an extremely pleasant 67 year old year old female patient here today for hx of melanoma in situ.  She notes spots on face.   .   Patient states this has been present for a while.  Patient reports the following symptoms:  none.  Patient reports the following previous treatments none.  These treatments did not work.  Patient reports the following modifying factors none.  Associated symptoms: none.  Patient has no other skin complaints today.  Remainder of the HPI, Meds, PMH, Allergies, FH, and SH was reviewed in chart.      Past Medical History:   Diagnosis Date     Malignant melanoma (H)      Melanoma of skin (H)     left buttocks       Past Surgical History:   Procedure Laterality Date     BREAST EXCISIONAL BIOPSY      all benign        Family History   Problem Relation Age of Onset     Cancer Mother         Lymph nodes     Other - See Comments Father         tobacco related death     Pulmonary Embolism Sister      Myocardial Infarction Sister         tobacco related     Other - See Comments Brother          in [place] war     Melanoma No family hx of        Social History     Socioeconomic History     Marital status:      Spouse name: Yehuda     Number of children: 3     Years of education: Not on file     Highest education level: Not on file   Occupational History     Occupation: missionary- Retired   Tobacco Use     Smoking status: Never     Smokeless tobacco: Never   Vaping Use     Vaping Use: Never used   Substance and Sexual Activity     Alcohol use: Never     Drug use: Never     Sexual activity: Yes     Partners: Male     Birth control/protection: Post-menopausal   Other Topics Concern     Not on file   Social History Narrative    ** Merged History Encounter **         Lives with Yehuda     Social Determinants of Health     Financial Resource Strain: Not on file   Food Insecurity: Not on file   Transportation Needs: Not on file   Physical Activity: Not on file   Stress:  Not on file   Social Connections: Not on file   Intimate Partner Violence: Not on file   Housing Stability: Not on file       Outpatient Encounter Medications as of 3/16/2023   Medication Sig Dispense Refill     vitamin B complex with vitamin C (STRESS TAB) tablet Take 1 tablet by mouth daily       VITAMIN D PO Take by mouth daily       VITAMIN E PO Take by mouth daily       mupirocin (BACTROBAN) 2 % external ointment Apply topically 3 times daily Apply to nares bid for 14 days (Patient not taking: Reported on 11/22/2021) 30 g 0     tretinoin (RETIN-A) 0.025 % external cream Apply a pea-sized amount evenly over the face at nighttime before bed 45 g 11     [DISCONTINUED] doxycycline monohydrate (MONODOX) 50 MG capsule Take 1 capsule (50 mg) by mouth 2 times daily 60 capsule 6     No facility-administered encounter medications on file as of 3/16/2023.             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  pigmented macules on trunk and ext with regular borders and pigment networks      Stuck on papules and brown macules on trunk and ext   Red papules on trunk  Flesh colored papules on trunk     The remainder of the full exam was normal; the following areas were examined:  conjunctiva/lids,, neck, peripheral vascular system, abdomen, lymph nodes, digits/nails, eccrine and apocrine glands, scalp/hair, face, neck, chest, abdomen, buttocks, back, RUE, LUE, RLE, LLE       Eyes: Conjunctivae/lids:Normal     ENT: Lips, buccal mucosa, tongue: normal    MSK:Normal    Cardiovascular: peripheral edema none    Pulm: Breathing Normal    Lymph Nodes: No Head and Neck Lymphadenopathy     Neuro/Psych: Orientation:Alert and Orientedx3 ; Mood/Affect:normal       A/P:  1. Seborrheic keratosis, lentigo,  angioma, dermal nevus  2, poikiloderma  ipl discussed with patient   3. Hx of melanoma in situ, nevi   It was a pleasure speaking to Yordan Enamorado today.  Previous clinic notes and pertinent laboratory tests were reviewed prior to Yordan Enamorado's visit.  Nature of benign skin lesions dicussed with patient.  Signs and Symptoms of skin cancer discussed with patient.  Patient encouraged to perform monthly skin exams.  UV precautions reviewed with patient.  Return to clinic 6 months

## 2023-04-24 ENCOUNTER — OFFICE VISIT (OUTPATIENT)
Dept: FAMILY MEDICINE | Facility: CLINIC | Age: 68
End: 2023-04-24
Payer: MEDICARE

## 2023-04-24 VITALS
DIASTOLIC BLOOD PRESSURE: 68 MMHG | HEIGHT: 67 IN | BODY MASS INDEX: 20.88 KG/M2 | SYSTOLIC BLOOD PRESSURE: 106 MMHG | HEART RATE: 57 BPM | WEIGHT: 133 LBS | RESPIRATION RATE: 24 BRPM

## 2023-04-24 DIAGNOSIS — R79.9 ABNORMAL FINDING OF BLOOD CHEMISTRY, UNSPECIFIED: ICD-10-CM

## 2023-04-24 DIAGNOSIS — Z23 ENCOUNTER FOR IMMUNIZATION: ICD-10-CM

## 2023-04-24 DIAGNOSIS — R35.89 POLYURIA: ICD-10-CM

## 2023-04-24 DIAGNOSIS — E55.9 VITAMIN D DEFICIENCY: ICD-10-CM

## 2023-04-24 DIAGNOSIS — Z23 NEED FOR DIPHTHERIA-TETANUS-PERTUSSIS (TDAP) VACCINE: ICD-10-CM

## 2023-04-24 DIAGNOSIS — Z78.9 VEGAN DIET: ICD-10-CM

## 2023-04-24 DIAGNOSIS — E61.1 IRON DEFICIENCY: ICD-10-CM

## 2023-04-24 DIAGNOSIS — Z13.220 LIPID SCREENING: ICD-10-CM

## 2023-04-24 DIAGNOSIS — Z00.00 ENCOUNTER FOR MEDICARE ANNUAL WELLNESS EXAM: Primary | ICD-10-CM

## 2023-04-24 DIAGNOSIS — R93.89 ABNORMAL FINDINGS ON DIAGNOSTIC IMAGING OF OTHER SPECIFIED BODY STRUCTURES: ICD-10-CM

## 2023-04-24 DIAGNOSIS — M85.89 OSTEOPENIA OF MULTIPLE SITES: ICD-10-CM

## 2023-04-24 DIAGNOSIS — Z11.59 ENCOUNTER FOR HEPATITIS C SCREENING TEST FOR LOW RISK PATIENT: ICD-10-CM

## 2023-04-24 PROBLEM — Z85.820 HISTORY OF MELANOMA: Status: ACTIVE | Noted: 2023-04-24

## 2023-04-24 PROBLEM — D03.9 MELANOMA IN SITU, UNSPECIFIED SITE (H): Status: ACTIVE | Noted: 2023-04-24

## 2023-04-24 LAB
CHOLEST SERPL-MCNC: 210 MG/DL
ERYTHROCYTE [DISTWIDTH] IN BLOOD BY AUTOMATED COUNT: 12.2 % (ref 10–15)
FASTING STATUS PATIENT QL REPORTED: NO
FERRITIN SERPL-MCNC: 75 NG/ML (ref 11–328)
GLUCOSE SERPL-MCNC: 94 MG/DL (ref 70–99)
HBA1C MFR BLD: 5.1 % (ref 0–5.6)
HCT VFR BLD AUTO: 37.2 % (ref 35–47)
HDLC SERPL-MCNC: 78 MG/DL
HGB BLD-MCNC: 11.9 G/DL (ref 11.7–15.7)
LDLC SERPL CALC-MCNC: 112 MG/DL
MCH RBC QN AUTO: 32.1 PG (ref 26.5–33)
MCHC RBC AUTO-ENTMCNC: 32 G/DL (ref 31.5–36.5)
MCV RBC AUTO: 100 FL (ref 78–100)
NONHDLC SERPL-MCNC: 132 MG/DL
PLATELET # BLD AUTO: 184 10E3/UL (ref 150–450)
RBC # BLD AUTO: 3.71 10E6/UL (ref 3.8–5.2)
TRIGL SERPL-MCNC: 98 MG/DL
TSH SERPL DL<=0.005 MIU/L-ACNC: 1.49 UIU/ML (ref 0.3–4.2)
WBC # BLD AUTO: 7.3 10E3/UL (ref 4–11)

## 2023-04-24 PROCEDURE — 36415 COLL VENOUS BLD VENIPUNCTURE: CPT | Performed by: FAMILY MEDICINE

## 2023-04-24 PROCEDURE — 80061 LIPID PANEL: CPT | Performed by: FAMILY MEDICINE

## 2023-04-24 PROCEDURE — 85027 COMPLETE CBC AUTOMATED: CPT | Performed by: FAMILY MEDICINE

## 2023-04-24 PROCEDURE — 84443 ASSAY THYROID STIM HORMONE: CPT | Performed by: FAMILY MEDICINE

## 2023-04-24 PROCEDURE — 83036 HEMOGLOBIN GLYCOSYLATED A1C: CPT | Performed by: FAMILY MEDICINE

## 2023-04-24 PROCEDURE — 82306 VITAMIN D 25 HYDROXY: CPT | Performed by: FAMILY MEDICINE

## 2023-04-24 PROCEDURE — G0438 PPPS, INITIAL VISIT: HCPCS | Performed by: FAMILY MEDICINE

## 2023-04-24 PROCEDURE — 82947 ASSAY GLUCOSE BLOOD QUANT: CPT | Performed by: FAMILY MEDICINE

## 2023-04-24 PROCEDURE — 82728 ASSAY OF FERRITIN: CPT | Performed by: FAMILY MEDICINE

## 2023-04-24 RX ORDER — CHOLECALCIFEROL (VITAMIN D3) 50 MCG
1 TABLET ORAL DAILY
COMMUNITY

## 2023-04-24 ASSESSMENT — ENCOUNTER SYMPTOMS
HEADACHES: 0
FREQUENCY: 1
HEARTBURN: 0
PALPITATIONS: 0
DIZZINESS: 0
WEAKNESS: 0
CHILLS: 0
HEMATOCHEZIA: 0
PARESTHESIAS: 0
SORE THROAT: 0
JOINT SWELLING: 0
MYALGIAS: 0
EYE PAIN: 0
DIARRHEA: 0
SHORTNESS OF BREATH: 0
FEVER: 0
ABDOMINAL PAIN: 0
DYSURIA: 0
NERVOUS/ANXIOUS: 0
HEMATURIA: 0
CONSTIPATION: 1
COUGH: 0
BREAST MASS: 0
NAUSEA: 0
ARTHRALGIAS: 0

## 2023-04-24 ASSESSMENT — ACTIVITIES OF DAILY LIVING (ADL): CURRENT_FUNCTION: NO ASSISTANCE NEEDED

## 2023-04-24 NOTE — PATIENT INSTRUCTIONS
[employer]    Get us a copy of your power of  for health care     Benefiber 1-2 teaspoons 1-2 times a day   Ground flax and hieu seeds     Patient Education   Personalized Prevention Plan  You are due for the preventive services outlined below.  Your care team is available to assist you in scheduling these services.  If you have already completed any of these items, please share that information with your care team to update in your medical record.  Health Maintenance Due   Topic Date Due    ANNUAL REVIEW OF HM ORDERS  Never done    Hepatitis C Screening  Never done    Diptheria Tetanus Pertussis (DTAP/TDAP/TD) Vaccine (1 - Tdap) Never done    Cholesterol Lab  Never done    Zoster (Shingles) Vaccine (1 of 2) Never done    Pneumococcal Vaccine (1 - PCV) Never done    COVID-19 Vaccine (4 - Booster for WHO-authorized series) 02/02/2022    Flu Vaccine (1) Never done

## 2023-04-24 NOTE — PROGRESS NOTES
SUBJECTIVE:   Yordan is a 67 year old who presents for Preventive Visit.      4/24/2023    12:08 PM   Additional Questions   Roomed by M   Accompanied by self     [facility] Maintenance Exam  [facility]  Phone : none    Assessment/Plan     1. Annual Wellness Visit as outlined below.   Health maintenance discussed as appropriate for age and risk factors including:  Nutrition, exercise, alcohol use, tobacco use, safe sexual practices, dental health.  Cancer screening assessed and ordered as indicated. Routine labs as outlined below based on age and risk factors reviewed today. Immunizations reviewed and updated.       Encounter for Medicare annual wellness exam  - PRIMARY CARE FOLLOW-UP SCHEDULING  - REVIEW OF HEALTH MAINTENANCE PROTOCOL ORDERS  - PRIMARY CARE FOLLOW-UP SCHEDULING    Need for diphtheria-tetanus-pertussis (Tdap) vaccine    Encounter for hepatitis C screening test for low risk patient    Encounter for immunization  - PNEUMOCOCCAL 20 VALENT CONJUGATE (PREVNAR 20)  - Tdap, tetanus-diptheria-acell pertussis, (BOOSTRIX) 5-2.5-18.5 LF-MCG/0.5 EN injection  Dispense: 0.5 mL; Refill: 0  - zoster vaccine recombinant adjuvanted (SHINGRIX) injection  Dispense: 0.5 mL; Refill: 0    Vitamin D deficiency  - Vitamin D Deficiency  - Vitamin D Deficiency    Vegan diet  Screen for anemia   - CBC with platelets  - Ferritin  - CBC with platelets  - Ferritin    Lipid screening  - Lipid Profile (Chol, Trig, HDL, LDL calc)  - Lipid Profile (Chol, Trig, HDL, LDL calc)    Osteopenia of multiple sites  Checking labs today.  Reviewed fall risk and dietary needs.    - TSH with free T4 reflex  - TSH with free T4 reflex    Abnormal findings on diagnostic imaging of other specified body structures  - CBC with platelets  - Ferritin  - TSH with free T4 reflex  - CBC with platelets  - Ferritin  - TSH with free T4 reflex    Iron deficiency  - Ferritin  - Ferritin    Polyuria  - Glucose  - Hemoglobin  A1c  - Glucose  - Hemoglobin A1c    Abnormal finding of blood chemistry, unspecified  - Hemoglobin A1c  - Hemoglobin A1c        Follow-up Visit   Expected date:  Apr 24, 2024 (Approximate)      Follow Up Appointment Details:     Follow-up with whom?: Specify Provider    Provider Name: BROWN BACA    Follow-Up for what?: Medicare Wellness    Welcome or Annual?: Annual Wellness    How?: In Person              Follow-up Visit   Expected date:  Apr 30, 2024 (Approximate)      Follow Up Appointment Details:     Follow-up with whom?: PCP    Follow-Up for what?: Medicare Wellness    Welcome or Annual?: Annual Wellness    How?: In Person                    HPI:     Chief Complaint   Patient presents with     Wellness Visit       Yordan Enamorado is a 67 year old female and is here for a comprehensive health maintenance exam.     Other concerns today:   Pain with intercourse     Vaccines   Declines covid booster  Very cautious about vaccines in general     Old Records-1: Outside allergies, meds, problems and immunizations were reconciled as needed from [facility]  Lab tests reviewed-1: 2021    Review of Systems   Complete ROS including General, HEENT, CV, Pulmonary, GI, , Genital, Neuro, Psych, MSK, Heme, Endocrine all within normal except as noted in the HPI or below as documented by patient.       Prevention of Osteoporosis:vitamin D 2000u daily, limited calcium in diet, raw greens   Last Dexa:2021- osteopenia     Cancer Screening:  Hemoccults/Colonoscopy: cologard normal 2021  Mammogram:  Normal 2023  Pap Smear:  na  Lung Cancer: na       Wt Readings from Last 3 Encounters:   04/24/23 60.3 kg (133 lb)   10/11/21 56.7 kg (125 lb)       Complete physical exam including:  General, HEENT, Neck, back, CV, Pulmonary, Abdominal, extremities, skin, neuro, psych, lymph exams all within normal.    Abnormalities include:  Grossly normal exam       Patient has been advised of split billing requirements and indicates  "understanding: Yes  Are you in the first 12 months of your Medicare coverage?  No    Healthy Habits:     Frequency of exercise:  4-5 days/week    Duration of exercise:  45-60 minutes    Do you usually eat at least 4 servings of fruit and vegetables a day, include whole grains    & fiber and avoid regularly eating high fat or \"junk\" foods?  Yes    Ability to successfully perform activities of daily living:  No assistance needed    Home Safety:  Throw rugs in the hallway and lack of grab bars in the bathroom    Hearing Impairment:  No hearing concerns    In the past 6 months, have you been bothered by leaking of urine?  No    In general, how would you rate your overall mental or emotional health?  Good      PHQ-2 Total Score: 0    Have you ever done Advance Care Planning? (For example, a Health Directive, POLST, or a discussion with a medical provider or your loved ones about your wishes): Yes, patient states has an Advance Care Planning document and will bring a copy to the clinic.    Fall risk  Fallen 2 or more times in the past year?: No  Any fall with injury in the past year?: No    Cognitive Screening   1) Repeat 3 items (Leader, Season, Table)    2) Clock draw: NORMAL  3) 3 item recall: Recalls 3 objects  Results: 3 items recalled: COGNITIVE IMPAIRMENT LESS LIKELY    Mini-CogTM Copyright ROXI Kaufman. Licensed by the author for use in [facility]; reprinted with permission (gena@.[facility]). All rights reserved.      Do you have sleep apnea, excessive snoring or daytime drowsiness?: no    Reviewed and updated as needed this visit by clinical staff   Tobacco  Allergies  Meds  Problems  Med Hx  Surg Hx  Fam Hx          Reviewed and updated as needed this visit by Provider   Tobacco  Allergies  Meds  Problems  Med Hx  Surg Hx  Fam Hx         Social History     Tobacco Use     Smoking status: Never     Passive exposure: Past     Smokeless tobacco: Never     Tobacco comments:     my history is with " my father smoking in our home   Vaping Use     Vaping status: Never Used   Substance Use Topics     Alcohol use: Not Currently             4/24/2023    11:40 AM   Alcohol Use   Prescreen: >3 drinks/day or >7 drinks/week? No     Do you have a current opioid prescription? No  Do you use any other controlled substances or medications that are not prescribed by a provider? None        Current providers sharing in care for this patient include:   Patient Care Team:  Malathi Singh MD as PCP - General (Family Medicine)  Malathi Singh MD as Assigned PCP  No Ref-Primary, Physician  Paul Wade MD as MD (Dermatology)  Rina Joiner MD as Referring Physician (Family Medicine)  Kurt Arellano MD as Assigned Surgical Provider    The following health maintenance items are reviewed in Epic and correct as of today:  Health Maintenance   Topic Date Due     HEPATITIS C SCREENING  Never done     DTAP/TDAP/TD IMMUNIZATION (1 - Tdap) Never done     Pneumococcal Vaccine: 65+ Years (1 - PCV) Never done     COVID-19 Vaccine (4 - Booster for WHO-authorized series) 02/02/2022     INFLUENZA VACCINE (1) Never done     ZOSTER IMMUNIZATION (2 of 2) 06/22/2023     DEXA  10/21/2023     MEDICARE ANNUAL WELLNESS VISIT  04/24/2024     ANNUAL REVIEW OF HM ORDERS  04/24/2024     FALL RISK ASSESSMENT  04/24/2024     COLORECTAL CANCER SCREENING  10/18/2024     MAMMO SCREENING  03/08/2025     LIPID  04/24/2028     ADVANCE CARE PLANNING  04/24/2028     PHQ-2 (once per calendar year)  Completed     IPV IMMUNIZATION  Aged Out     MENINGITIS IMMUNIZATION  Aged Out     FHS-7:       10/11/2021    11:56 AM 10/28/2021     7:55 AM 3/8/2023    10:00 AM 4/17/2023     6:36 PM 4/24/2023    11:41 AM   Breast CA Risk Assessment (FHS-7)   Did any of your first-degree relatives have breast or ovarian cancer? No No No No No   Did any of your relatives have bilateral breast cancer? Unknown No No No No   Did any man in your family have breast cancer?  "No No No No No   Did any woman in your family have breast and ovarian cancer? No No No Yes Yes   Did any woman in your family have breast cancer before age 50 y? No No No No No   Do you have 2 or more relatives with breast and/or ovarian cancer? No No No No No   Do you have 2 or more relatives with breast and/or bowel cancer? No No No No No     Mammogram Screening: Recommended mammography every 1-2 years with patient discussion and risk factor consideration  Pertinent mammograms are reviewed under the imaging tab.    Review of Systems   Constitutional: Negative for chills and fever.   HENT: Negative for congestion, ear pain, hearing loss and sore throat.    Eyes: Negative for pain and visual disturbance.   Respiratory: Negative for cough and shortness of breath.    Cardiovascular: Negative for chest pain, palpitations and peripheral edema.   Gastrointestinal: Positive for constipation. Negative for abdominal pain, diarrhea, heartburn, hematochezia and nausea.   Breasts:  Negative for tenderness, breast mass and discharge.   Genitourinary: Positive for frequency. Negative for dysuria, genital sores, hematuria, pelvic pain, urgency, vaginal bleeding and vaginal discharge.   Musculoskeletal: Negative for arthralgias, joint swelling and myalgias.   Skin: Negative for rash.   Neurological: Negative for dizziness, weakness, headaches and paresthesias.   Psychiatric/Behavioral: Negative for mood changes. The patient is not nervous/anxious.        OBJECTIVE:   /68 (BP Location: Right arm, Patient Position: Sitting, Cuff Size: Adult Large)   Pulse 57   Resp 24   Ht 1.69 m (5' 6.54\")   Wt 60.3 kg (133 lb)   BMI 21.12 kg/m   Estimated body mass index is 21.12 kg/m  as calculated from the following:    Height as of this encounter: 1.69 m (5' 6.54\").    Weight as of this encounter: 60.3 kg (133 lb).  Physical Exam      ASSESSMENT / PLAN:       COUNSELING:      She reports that she has never smoked. She has been " exposed to tobacco smoke. She has never used smokeless tobacco.      Appropriate preventive services were discussed with this patient, including applicable screening as appropriate for cardiovascular disease, diabetes, osteopenia/osteoporosis, and glaucoma.  As appropriate for age/gender, discussed screening for colorectal cancer, prostate cancer, breast cancer, and cervical cancer. Checklist reviewing preventive services available has been given to the patient.    Reviewed patients plan of care and provided an AVS. The Basic Care Plan (routine screening as documented in Health Maintenance) for Yordan meets the Care Plan requirement. This Care Plan has been established and reviewed with the Patient.          Malathi Singh MD  [facility]    Identified Health Risks:    I have reviewed Opioid Use Disorder and Substance Use Disorder risk factors and made any needed referrals.

## 2023-04-25 LAB — DEPRECATED CALCIDIOL+CALCIFEROL SERPL-MC: 43 UG/L (ref 20–75)

## 2023-04-27 PROBLEM — F48.9 MENTAL HEALTH PROBLEM: Status: ACTIVE | Noted: 2023-04-27

## 2023-06-27 ENCOUNTER — DOCUMENTATION ONLY (OUTPATIENT)
Dept: OTHER | Facility: CLINIC | Age: 68
End: 2023-06-27
Payer: MEDICARE

## 2023-11-13 ENCOUNTER — FCC EXTENDED DOCUMENTATION (OUTPATIENT)
Dept: PSYCHOLOGY | Facility: CLINIC | Age: 68
End: 2023-11-13
Payer: MEDICARE

## 2023-11-13 NOTE — PROGRESS NOTES
"[facility]      PATIENT'S NAME: Yordan Enamorado  PREFERRED NAME: Yordan  PRONOUNS: she/her/hers/herself  MRN: [id number]  : 1955  ADDRESS: [address]  [place] [address]  ACCT. NUMBER:  [id number]  DATE OF SERVICE: 2023  START TIME: 12:00 PM  END TIME: 12:53 PM  PREFERRED PHONE: [phone]  May we leave a program related message: Yes  EMERGENCY CONTACT: was obtained     FeiYehuda (Spouse)  [phone] (Home Phone)    .  SERVICE MODALITY:  Video Visit:      Provider verified identity through the following two step process.  Patient provided:  Patient  and Patient address    Telemedicine Visit: The patient's condition can be safely assessed and treated via synchronous audio and visual telemedicine encounter.      Reason for Telemedicine Visit: Services only offered telehealth    Originating Site (Patient Location): Patient's home    Distant Site (Provider Location): Provider Remote Setting- Home Office    Consent:  The patient/guardian has verbally consented to: the potential risks and benefits of telemedicine (video visit) versus in person care; bill my insurance or make self-payment for services provided; and responsibility for payment of non-covered services.     Patient would like the video invitation sent by:  My Chart    Mode of Communication:  Video Conference via Amwell    Distant Location (Provider):  Off-site    As the provider I attest to compliance with applicable laws and regulations related to telemedicine.    UNIVERSAL ADULT Mental Health DIAGNOSTIC ASSESSMENT    Identifying Information:  Patient is a 68 year old,   individual.  Patient was referred for an assessment by self.  Patient attended the session alone.    Chief Complaint:   The reason for seeking services at this time is: \"trauma\".  The problem(s) began 79.  Client reported that her 's pornography addiction began in .  She reported it \"consumes me\" and has for 44 " "years of marriage.  She reported other addictive behaviors such as alcohol use have been a problem in their marriage.  She reported that she has spent decades attempting to \"fix him\", describes herself as co-dependent, and realizes that she cannot change his behavior.  She reported she wants to engage in therapy to have \"peace in heart, mind, and soul\".  Clients children and grandchildren live out of the country, which can contribute to feelings of loneliness.  Client shared interest in engaging in EMDR therapy.    Patient has attempted to resolve these concerns in the past through Al-Anon, couples therapy and individual therapy .    Social/Family History:  Patient reported [place].  They were raised by biological parents  .  Parents were always together.  Parents are . Patient reported that their childhood was: [employer] are really hard core people; they are hardy, hardworking, honest, dependable, that's the inheritance we get.  My parents were workaholics.  Client was the fourth born of six children; she had two older sisters, an older brother, and two younger brothers.  Three of clients siblings are .  Client has a loving relationship with her two younger brothers    The patient describes their cultural background as .  Cultural influences and impact on patient's life structure, values, norms, and healthcare: I'm female.  Client reported being spiritual.  Client spent 23 years in [place] and reported some of their traditions are important to her.  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    These factors will be addressed in the Preliminary Treatment plan. Patient identified their preferred language to be English. Patient reported they does not need the assistance of an  or other support involved in therapy.     Patient reported had no significant delays in developmental tasks.   Patient's highest education level was graduate school  .  Patient identified the following learning problems: none reported.  Modifications will not be used to assist communication in therapy. Patient reports they are  able to understand written materials.    Patient's current relationship status is  for 44 years.   Patient identified their sexual orientation as heterosexual.  Patient reported having 3 child(maría); two girls and a boy; two grandchildren.  One child and two grandchildren in [place] and two children in [place].  Patient identified adult child; friends; spouse as part of their support system.  Patient identified the quality of these relationships as inconsistent,  .      Patient's current living/housing situation involves staying in own home/apartment.  The immediate members of family and household include Rabia Martinez,spouse and they report that housing is stable.    Patient is currently retired and has been since March 2022.  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  Patient reports their finances are obtained through employment. Patient does not identify finances as a current stressor.      Patient reported that they have not been involved with the legal system.    Patient does not report being under probation/ parole/ jurisdiction.     Patient's Strengths and Limitations:  Patient identified the following strengths or resources that will help them succeed in treatment: commitment to health and well being, exercise routine, radha / spirituality, friends / good social support, family  support, and work ethic. Things that may interfere with the patient's success in treatment include: none identified.     Assessments:  The following assessments were completed by patient for this visit:  PHQ9:       11/14/2023     8:00 AM   PHQ-9 SCORE   PHQ-9 Total Score 3     GAD7:       11/14/2023     8:00 AM   NASEEM-7 SCORE   Total Score 5     CAGE-AID:       11/7/2023    10:01 AM   CAGE-AID Total Score   Total Score 0   Total Score MyChart 0 (A total score of 2 or greater is considered clinically significant)     PROMIS 10-Global Health (only subscores and total score):       11/7/2023    10:01 AM   PROMIS-10 Scores Only   Global Mental Health Score 14   Global Physical Health Score 16   PROMIS TOTAL - SUBSCORES 30     [place] Suicide Severity Rating Scale (Lifetime/Recent)      11/14/2023     8:00 AM 11/14/2023     1:00 PM   [place] Suicide Severity Rating (Lifetime/Recent)   Q1 Wish to be Dead (Lifetime)  No   Q2 Non-Specific Active Suicidal Thoughts (Lifetime)  No   Q1 Wished to be Dead (Past Month) no no   Q2 Suicidal Thoughts (Past Month) no no   Q6 Suicide Behavior (Lifetime) no no   Actual Attempt (Lifetime)  N   Has subject engaged in non-suicidal self-injurious behavior? (Lifetime)  N   Interrupted Attempts (Lifetime)  N   Aborted or Self-Interrupted Attempt (Lifetime)  N   Preparatory Acts or Behavior (Lifetime)  N   Calculated C-SSRS Risk Score (Lifetime/Recent)  No Risk Indicated          Personal and Family Medical History:  Patient does not report a family history of mental health concerns.  Patient reports family history includes Cancer in her mother; Diabetes in her paternal grandfather; Myocardial Infarction in her sister; No Known Problems in her daughter, daughter, and son; Other - See Comments in her brother and father; Other Cancer in her mother; Pulmonary Embolism in her sister..     Patient does report Mental Health Diagnosis and/or Treatment.  Patient reported the following previous  diagnoses which include(s): none reported.  Patient reported symptoms began [birth year]09[birth year]..   Patient has received mental health services in the past:  individual and couples therapy .  Psychiatric Hospitalizations: None.  Patient denies a history of civil commitment.  Patient is not receiving other mental health services.      Patient has had a physical exam to rule out medical causes for current symptoms.  Date of last physical exam was within the past year. Client was encouraged to follow up with PCP if symptoms were to develop. The patient has a [facility] Primary Care Provider, who is named Malathi Singh.  Patient reports no current medical concerns.  Patient denies any issues with pain..   There are not significant appetite / nutritional concerns / weight changes.   Patient does not report a history of head injury / trauma / cognitive impairment.      Patient reports not taking any current medications    Medication Adherence:  Patient reports not currently prescribed.    Patient Allergies:    Allergies   Allergen Reactions    Demerol [Meperidine] Unknown     Pet patient- she was told by nurse that she might have an allergy to demerol. This was noted after a colonoscopy she had done.       Medical History:    Past Medical History:   Diagnosis Date    Arthritis     Diabetes (H)     Melanoma of skin (H)     left buttocks         Current Mental Status Exam:   Appearance:  Appropriate    Eye Contact:  Good   Psychomotor:  Normal       Gait / station:  Unable to assess via video  Attitude / Demeanor: Cooperative  Interested Pleasant  Speech      Rate / Production: Normal/ Responsive      Volume:  Normal  volume      Language:  intact  Mood:   Anxious   Affect:   Appropriate    Thought Content: Clear   Thought Process: Coherent       Associations: No loosening of associations  Insight:   Good   Judgment:  Intact   Orientation:  All  Attention/concentration: Good    Substance Use:   Patient did report a family history of  "substance use concerns; siblings, youngest child, and \"almost everyone in my extended family\".  Patient has not received chemical dependency treatment in the past.  Patient has not ever been to detox.      Patient is not currently receiving any chemical dependency treatment.           Substance History of use Age of first use Date of last use     Pattern and duration of use (include amounts and frequency)   Alcohol never used       NA   Cannabis   never used     NA     Amphetamines   never used     NA   Cocaine/crack    never used       NA   Hallucinogens never used         NA   Inhalants never used         NA   Heroin never used         NA   Other Opiates never used     NA   Benzodiazepine   never used     NA   Barbiturates never used     NA   Over the counter meds never used     NA   Caffeine never used     NA   Nicotine  never used     NA   Other substances not listed above:  Identify:  never used     NA     Patient reported the following problems as a result of their substance use: none    Substance Use: No symptoms    Based on the negative CAGE score and clinical interview there  are not indications of drug or alcohol abuse.    Significant Losses / Trauma / Abuse / Neglect Issues:   Patient did not serve in the .  There are indications or report of significant loss, trauma, abuse or neglect issues related to:     Death of:  3 Siblings, including a brother that was killed in [place] in   Mother  when client was 18 years old from cancer that was undiagnosed    Trauma:  's pornography addiction throughout the past 44 years.  - father's break down following her mother's death    Concerns for possible neglect are not present.     Safety Assessment:   Patient denies current homicidal ideation and behaviors.  Patient denies current self-injurious ideation and behaviors.    Patient denied risk behaviors associated with substance use.   Patient denies any high risk behaviors associated with " mental health symptoms.  Patient reports the following current concerns for their personal safety: None.  Patient reports there are not firearms in the house.     History of Safety Concerns:  Patient denied a history of homicidal ideation.     Patient reported a history of personal safety concerns: living situation / housing: [place]  Patient denied a history of assaultive behaviors.    Patient denied a history of sexual assault behaviors.     Patient denied a history of risk behaviors associated with substance use.  Patient denies any history of high risk behaviors associated with mental health symptoms.  Patient reports the following protective factors: dedication to family or friends; effectively controls impulses; regular physical activity; sense of belonging; help seeking behaviors when distressed; abstinence from substances; daily obligations; structured day; effective problem solving skills; commitment to well being; positive social skills; financial stability; strong sense of self worth or esteem; sense of personal control or determination    Risk Plan:  See Recommendations for Safety and Risk Management Plan    Review of Symptoms per patient report:   Depression: Change in sleep and Ruminations  Estrella:  No Symptoms  Psychosis: No Symptoms  Anxiety: Excessive worry, Nervousness, Sleep disturbance, and Ruminations  Panic:  No symptoms  Post Traumatic Stress Disorder:  Experienced traumatic event see trauma history    Eating Disorder: No Symptoms  ADD / ADHD:  No symptoms  Conduct Disorder: No symptoms  Autism Spectrum Disorder: No symptoms  Obsessive Compulsive Disorder: No Symptoms    Patient reports the following compulsive behaviors and treatment history:  None .      Diagnostic Criteria:   Adjustment Disorder  A. The development of emotional or behavioral symptoms in response to an identifiable stressor(s) occurring within 3 months of the onset of the stressor(s)  B. These symptoms  "or behaviors are clinically significant, as evidenced by one or both of the following:       - Significant impairment in social, occupational, or other important areas of functioning  C. The stress-related disturbance does not meet criteria for another disorder & is not not an exacerbation of another mental disorder  D. The symptoms do not represent normal bereavement  E. Once the stressor or its consequences have terminated, the symptoms do not persist for more than an additional 6 months       * Adjustment Disorder with Mixed Anxiety and Depressed Mood: The predominant manifestation is a combination of depression and anxiety    Functional Status:  Patient reports the following functional impairments:  relationship(s).       Clinical Summary:  1. Psychosocial, Cultural and Contextual Factors: The patient describes their cultural background as .  Cultural influences and impact on patient's life structure, values, norms, and healthcare: I'm female.  Client reported being spiritual.  Client spent 23 years in [place] and reported some of their traditions are important to her.  Contextual Factors: Individual Factors client wants to identify \"who am I\" since retiring, Family Factors client's 's history of addiction has had an impact on client and their marriage,  and Societal Factors [place] [employer] [place] in [place] has had some challenges due to culture differences.   2. Principal DSM5 Diagnoses  (Sustained by DSM5 Criteria Listed Above):   Adjustment Disorders  309.24 (F43.22) With anxiety   3. Other Diagnoses that is relevant to services:   None  4. Provisional Diagnosis:  None  5. Prognosis: Expect Improvement.  6. Likely consequences of symptoms if not treated: worsening symptoms.  7. Client strengths include:  educated, goal-focused, has a previous history of therapy, insightful, open to learning, support of family, friends and " providers, willing to ask questions, and work history .     Recommendations:     1. Plan for Safety and Risk Management:   Safety and Risk: Recommended that patient call 911 or go to the local ED should there be a change in any of these risk factors..          Report to child / adult protection services was NA.     2. Patient's identified mental health concerns with a cultural influence will be addressed by therapist using person centered approach .     3. Initial Treatment will focus on:    Adjustment Difficulties related to: family concerns.  Client is interested in EMDR therapy     4. Resources/Service Plan:    services are not indicated.   Modifications to assist communication are not indicated.   Additional disability accommodations are not indicated.      5. Collaboration:   Collaboration / coordination of treatment will be initiated with the following  support professionals: primary care physician.      6.  Referrals:   The following referral(s) will be initiated:  None .       A Release of Information has been obtained for the following:  NA .     Clinical Substantiation/medical necessity for the above recommendations:  client has history of trauma and significant loss and is impacted by symptoms due to these experiences.  She would likely benefit from additional support and learning strategies to manage symptoms.    7. RONNIE:    RONNIE:  No use    8. Records:   These were reviewed at time of assessment.   Information in this assessment was obtained from the medical record and  provided by patient who is a good historian.    Patient will have open access to their mental health medical record.    9.   Interactive Complexity: No    10. Safety Plan:  Patient has no change in safety concerns. Committed to safety and agreed to follow previously developed safety plan.    Provider Name/ Credentials:  Emilee Marcelo, [facility]  12/13/2023

## 2023-11-14 ENCOUNTER — VIRTUAL VISIT (OUTPATIENT)
Dept: PSYCHOLOGY | Facility: CLINIC | Age: 68
End: 2023-11-14
Payer: MEDICARE

## 2023-11-14 DIAGNOSIS — F43.23 ADJUSTMENT DISORDER WITH MIXED ANXIETY AND DEPRESSED MOOD: Primary | ICD-10-CM

## 2023-11-14 PROCEDURE — 90837 PSYTX W PT 60 MINUTES: CPT | Mod: VID | Performed by: SOCIAL WORKER

## 2023-11-14 ASSESSMENT — COLUMBIA-SUICIDE SEVERITY RATING SCALE - C-SSRS
6. HAVE YOU EVER DONE ANYTHING, STARTED TO DO ANYTHING, OR PREPARED TO DO ANYTHING TO END YOUR LIFE?: NO
1. IN THE PAST MONTH, HAVE YOU WISHED YOU WERE DEAD OR WISHED YOU COULD GO TO SLEEP AND NOT WAKE UP?: NO
2. HAVE YOU ACTUALLY HAD ANY THOUGHTS OF KILLING YOURSELF IN THE PAST MONTH?: NO

## 2023-11-14 ASSESSMENT — ANXIETY QUESTIONNAIRES
5. BEING SO RESTLESS THAT IT IS HARD TO SIT STILL: NOT AT ALL
6. BECOMING EASILY ANNOYED OR IRRITABLE: NOT AT ALL
4. TROUBLE RELAXING: MORE THAN HALF THE DAYS
2. NOT BEING ABLE TO STOP OR CONTROL WORRYING: MORE THAN HALF THE DAYS
GAD7 TOTAL SCORE: 5
3. WORRYING TOO MUCH ABOUT DIFFERENT THINGS: SEVERAL DAYS
1. FEELING NERVOUS, ANXIOUS, OR ON EDGE: NOT AT ALL
GAD7 TOTAL SCORE: 5
7. FEELING AFRAID AS IF SOMETHING AWFUL MIGHT HAPPEN: NOT AT ALL

## 2023-11-14 ASSESSMENT — PATIENT HEALTH QUESTIONNAIRE - PHQ9: SUM OF ALL RESPONSES TO PHQ QUESTIONS 1-9: 3

## 2023-11-14 NOTE — Clinical Note
I met with client today for initial individual therapy appointment.  I will complete the diagnostic assessment when I see her again.  Take care, Emilee

## 2023-11-14 NOTE — PROGRESS NOTES
[facility]   Mental Health & Addiction Services     Progress Note - Initial Visit    Patient  Name:  Yordan Enamorado Date: 2023         Service Type: Individual     Visit Start Time: 8:36 AM  Visit End Time: 9:32 AM    Session Length: 53 + minutes    Extended Session (53+ minutes): PROLONGED SERVICE IN THE OUTPATIENT SETTING REQUIRING DIRECT (FACE-TO-FACE) PATIENT CONTACT BEYOND THE USUAL SERVICE:    - Longer session due to limited access to mental health appointments and necessity to address patient's distress / complexity    Interactive Complexity: No  Crisis: No     Visit #: 1    Attendees: Client attended alone    Service Modality:  Video Visit:      Provider verified identity through the following two step process.  Patient provided:  Patient  and Patient address    Telemedicine Visit: The patient's condition can be safely assessed and treated via synchronous audio and visual telemedicine encounter.      Reason for Telemedicine Visit: Services only offered telehealth    Originating Site (Patient Location): Patient's home    Distant Site (Provider Location): Provider Remote Setting- Home Office    Consent:  The patient/guardian has verbally consented to: the potential risks and benefits of telemedicine (video visit) versus in person care; bill my insurance or make self-payment for services provided; and responsibility for payment of non-covered services.     Patient would like the video invitation sent by:  My Chart    Mode of Communication:  Video Conference via Amwell    Distant Location (Provider):  Off-site    As the provider I attest to compliance with applicable laws and regulations related to telemedicine.     Presenting Concerns/  Current Stressors:   Dynamics in relationship with       ASSESSMENT:  Mental Status Assessment:  Appearance:   Appropriate   Eye Contact:   Fair   Psychomotor Behavior: Normal   Attitude:   Cooperative  Interested Pleasant  Guarded  Orientation:   All  Speech   Rate / Production: Normal/ Responsive   Volume:  Normal   Mood:    Anxious   Affect:    Appropriate   Thought Content:  Clear   Thought Form:  Coherent   Insight:    Good , Intellectual Insight, and Spiritual insight    Assessments completed prior to this visit:  The following assessments were completed by patient for this visit:  PHQ9:       11/14/2023     8:00 AM   PHQ-9 SCORE   PHQ-9 Total Score 3     GAD7:       11/14/2023     8:00 AM   NASEEM-7 SCORE   Total Score 5     CAGE-AID:       11/7/2023    10:01 AM   CAGE-AID Total Score   Total Score 0   Total Score MyChart 0 (A total score of 2 or greater is considered clinically significant)     PROMIS 10-Global Health (only subscores and total score):       11/7/2023    10:01 AM   PROMIS-10 Scores Only   Global Mental Health Score 14   Global Physical Health Score 16   PROMIS TOTAL - SUBSCORES 30     [place] Suicide Severity Rating Scale (Lifetime/Recent)      11/14/2023     8:00 AM 11/14/2023     1:00 PM   [place] Suicide Severity Rating (Lifetime/Recent)   Q1 Wish to be Dead (Lifetime)  No   Q2 Non-Specific Active Suicidal Thoughts (Lifetime)  No   Q1 Wished to be Dead (Past Month) no no   Q2 Suicidal Thoughts (Past Month) no no   Q6 Suicide Behavior (Lifetime) no no   Actual Attempt (Lifetime)  N   Has subject engaged in non-suicidal self-injurious behavior? (Lifetime)  N   Interrupted Attempts (Lifetime)  N   Aborted or Self-Interrupted Attempt (Lifetime)  N   Preparatory Acts or Behavior (Lifetime)  N   Calculated C-SSRS Risk Score (Lifetime/Recent)  No Risk Indicated         Safety Issues and Plan for Safety and Risk Management:      Patient denies current fears or concerns for personal safety.  Patient denies current or recent suicidal ideation or behaviors.  Patient denies current or recent homicidal ideation or behaviors.  Patient denies current or recent self injurious behavior or ideation.  Patient denies other safety  concerns.  Recommended that patient call 911 or go to the local ED should there be a change in any of these risk factors.  Patient reports there are no firearms in the house.     Diagnostic Criteria:  Adjustment Disorder  A. The development of emotional or behavioral symptoms in response to an identifiable stressor(s) occurring within 3 months of the onset of the stressor(s)  B. These symptoms or behaviors are clinically significant, as evidenced by one or both of the following:       - Significant impairment in social, occupational, or other important areas of functioning  C. The stress-related disturbance does not meet criteria for another disorder & is not not an exacerbation of another mental disorder  D. The symptoms do not represent normal bereavement  E. Once the stressor or its consequences have terminated, the symptoms do not persist for more than an additional 6 months       * Adjustment Disorder with Mixed Anxiety and Depressed Mood: The predominant manifestation is a combination of depression and anxiety      DSM5 Diagnoses: (Sustained by DSM5 Criteria Listed Above)  Diagnoses: Adjustment Disorders  309.24 (F43.22) With anxiety  Psychosocial & Contextual Factors: stressors in relationship with , children and grandchildren live outside the country  Intervention:   Engaged client in completion of flowsheets   Began diagnostic assessment  Collateral Reports Completed:  Routed note to PCP      PLAN: (Homework, other):  1. Provider will continue Diagnostic Assessment.  Patient was given the following to do until next session:  review information on EMDR therapy.    2. Provider recommended the following referrals: None.      3.  Suicide Risk and Safety Concerns were assessed for Yordan Enamorado.    Patient meets the following risk assessment and triage: Patient denied any current/recent/lifetime history of suicidal ideation and/or behaviors.  No safety plan indicated at this time.       Emilee Marcelo,  LICSW  November 14, 2023

## 2023-12-13 ENCOUNTER — VIRTUAL VISIT (OUTPATIENT)
Dept: PSYCHOLOGY | Facility: CLINIC | Age: 68
End: 2023-12-13
Payer: MEDICARE

## 2023-12-13 DIAGNOSIS — F43.23 ADJUSTMENT DISORDER WITH MIXED ANXIETY AND DEPRESSED MOOD: Primary | ICD-10-CM

## 2023-12-13 PROCEDURE — 90791 PSYCH DIAGNOSTIC EVALUATION: CPT | Mod: 95 | Performed by: SOCIAL WORKER

## 2023-12-13 NOTE — PROGRESS NOTES
"[facility]      PATIENT'S NAME: Yordan Enamorado  PREFERRED NAME: Yordan  PRONOUNS: she/her/hers/herself  MRN: [id number]  : 1955  ADDRESS: [address]  [place] [address]  ACCT. NUMBER:  [id number]  DATE OF SERVICE: 2023  START TIME: 12:00 PM  END TIME: 12:53 PM  PREFERRED PHONE: [phone]  May we leave a program related message: Yes  EMERGENCY CONTACT: was obtained     FeiYehuda (Spouse)  [phone] (Home Phone)    .  SERVICE MODALITY:  Video Visit:      Provider verified identity through the following two step process.  Patient provided:  Patient  and Patient address    Telemedicine Visit: The patient's condition can be safely assessed and treated via synchronous audio and visual telemedicine encounter.      Reason for Telemedicine Visit: Services only offered telehealth    Originating Site (Patient Location): Patient's home    Distant Site (Provider Location): Provider Remote Setting- Home Office    Consent:  The patient/guardian has verbally consented to: the potential risks and benefits of telemedicine (video visit) versus in person care; bill my insurance or make self-payment for services provided; and responsibility for payment of non-covered services.     Patient would like the video invitation sent by:  My Chart    Mode of Communication:  Video Conference via [facility]    Distant Location (Provider):  Off-site    As the provider I attest to compliance with applicable laws and regulations related to telemedicine.    UNIVERSAL ADULT Mental Health DIAGNOSTIC ASSESSMENT    Identifying Information:  Patient is a 68 year old,   individual.  Patient was referred for an assessment by self.  Patient attended the session alone.    Chief Complaint:   The reason for seeking services at this time is: \"trauma\".  The problem(s) began 79.  Client reported that her 's pornography addiction began in .  She reported it \"consumes me\" and has for 44 " "years of marriage.  She reported other addictive behaviors such as alcohol use have been a problem in their marriage.  She reported that she has spent decades attempting to \"fix him\", describes herself as co-dependent, and realizes that she cannot change his behavior.  She reported she wants to engage in therapy to have \"peace in heart, mind, and soul\".  Clients children and grandchildren live out of the country, which can contribute to feelings of loneliness.  Client shared interest in engaging in EMDR therapy.    Patient has attempted to resolve these concerns in the past through Al-Anon, couples therapy and individual therapy .    Social/Family History:  Patient reported [place].  They were raised by biological parents  .  Parents were always together.  Parents are . Patient reported that their childhood was: [facility] are really hard core people; they are hardy, hardworking, honest, dependable, that's the inheritance we get.  My parents were workaholics.  Client was the fourth born of six children; she had two older sisters, an older brother, and two younger brothers.  Three of clients siblings are .  Client has a loving relationship with her two younger brothers    The patient describes their cultural background as .  Cultural influences and impact on patient's life structure, values, norms, and healthcare: I'm female.  Client reported being spiritual.  Client spent 23 years in [place] and reported some of their traditions are important to her.  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    These factors will be addressed in the Preliminary Treatment plan. Patient identified their preferred language to be English. Patient reported they does not need the assistance of an  or other support involved in therapy.     Patient reported had no significant delays in developmental tasks.   Patient's highest education level was graduate school  .  Patient identified the following learning problems: none reported.  Modifications will not be used to assist communication in therapy. Patient reports they are  able to understand written materials.    Patient's current relationship status is  for 44 years.   Patient identified their sexual orientation as heterosexual.  Patient reported having 3 child(maría); two girls and a boy; two grandchildren.  One child and two grandchildren in [place] and two children in [place].  Patient identified adult child; friends; spouse as part of their support system.  Patient identified the quality of these relationships as inconsistent,  .      Patient's current living/housing situation involves staying in own home/apartment.  The immediate members of family and household include Rabia Martinez,spouse and they report that housing is stable.    Patient is currently retired and has been since March 2022.  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  Patient reports their finances are obtained through employment. Patient does not identify finances as a current stressor.      Patient reported that they have not been involved with the legal system.    Patient does not report being under probation/ parole/ jurisdiction.     Patient's Strengths and Limitations:  Patient identified the following strengths or resources that will help them succeed in treatment: commitment to health and well being, exercise routine, radha / spirituality, friends / good social support, family  support, and work ethic. Things that may interfere with the patient's success in treatment include: none identified.     Assessments:  The following assessments were completed by patient for this visit:  PHQ9:       11/14/2023     8:00 AM   PHQ-9 SCORE   PHQ-9 Total Score 3     GAD7:       11/14/2023     8:00 AM   NASEEM-7 SCORE   Total Score 5     CAGE-AID:       11/7/2023    10:01 AM   CAGE-AID Total Score   Total Score 0   Total Score MyChart 0 (A total score of 2 or greater is considered clinically significant)     PROMIS 10-Global Health (only subscores and total score):       11/7/2023    10:01 AM   PROMIS-10 Scores Only   Global Mental Health Score 14   Global Physical Health Score 16   PROMIS TOTAL - SUBSCORES 30     [place] Suicide Severity Rating Scale (Lifetime/Recent)      11/14/2023     8:00 AM 11/14/2023     1:00 PM   [place] Suicide Severity Rating (Lifetime/Recent)   Q1 Wish to be Dead (Lifetime)  No   Q2 Non-Specific Active Suicidal Thoughts (Lifetime)  No   Q1 Wished to be Dead (Past Month) no no   Q2 Suicidal Thoughts (Past Month) no no   Q6 Suicide Behavior (Lifetime) no no   Actual Attempt (Lifetime)  N   Has subject engaged in non-suicidal self-injurious behavior? (Lifetime)  N   Interrupted Attempts (Lifetime)  N   Aborted or Self-Interrupted Attempt (Lifetime)  N   Preparatory Acts or Behavior (Lifetime)  N   Calculated C-SSRS Risk Score (Lifetime/Recent)  No Risk Indicated          Personal and Family Medical History:  Patient does not report a family history of mental health concerns.  Patient reports family history includes Cancer in her mother; Diabetes in her paternal grandfather; Myocardial Infarction in her sister; No Known Problems in her daughter, daughter, and son; Other - See Comments in her brother and father; Other Cancer in her mother; Pulmonary Embolism in her sister..     Patient does report Mental Health Diagnosis and/or Treatment.  Patient reported the following previous  diagnoses which include(s): none reported.  Patient reported symptoms began [birth year]/09[birth year]..   Patient has received mental health services in the past:  individual and couples therapy .  Psychiatric Hospitalizations: None.  Patient denies a history of civil commitment.  Patient is not receiving other mental health services.      Patient has had a physical exam to rule out medical causes for current symptoms.  Date of last physical exam was within the past year. Client was encouraged to follow up with PCP if symptoms were to develop. The patient has a [facility] Primary Care Provider, who is named Malathi Singh.  Patient reports no current medical concerns.  Patient denies any issues with pain..   There are not significant appetite / nutritional concerns / weight changes.   Patient does not report a history of head injury / trauma / cognitive impairment.      Patient reports not taking any current medications    Medication Adherence:  Patient reports not currently prescribed.    Patient Allergies:    Allergies   Allergen Reactions    Demerol [Meperidine] Unknown     Pet patient- she was told by nurse that she might have an allergy to demerol. This was noted after a colonoscopy she had done.       Medical History:    Past Medical History:   Diagnosis Date    Arthritis     Diabetes (H)     Melanoma of skin (H)     left buttocks         Current Mental Status Exam:   Appearance:  Appropriate    Eye Contact:  Good   Psychomotor:  Normal       Gait / station:  Unable to assess via video  Attitude / Demeanor: Cooperative  Interested Pleasant  Speech      Rate / Production: Normal/ Responsive      Volume:  Normal  volume      Language:  intact  Mood:   Anxious   Affect:   Appropriate    Thought Content: Clear   Thought Process: Coherent       Associations: No loosening of associations  Insight:   Good   Judgment:  Intact   Orientation:  All  Attention/concentration: Good    Substance Use:   Patient did report a family history of  "substance use concerns; siblings, youngest child, and \"almost everyone in my extended family\".  Patient has not received chemical dependency treatment in the past.  Patient has not ever been to detox.      Patient is not currently receiving any chemical dependency treatment.           Substance History of use Age of first use Date of last use     Pattern and duration of use (include amounts and frequency)   Alcohol never used       NA   Cannabis   never used     NA     Amphetamines   never used     NA   Cocaine/crack    never used       NA   Hallucinogens never used         NA   Inhalants never used         NA   Heroin never used         NA   Other Opiates never used     NA   Benzodiazepine   never used     NA   Barbiturates never used     NA   Over the counter meds never used     NA   Caffeine never used     NA   Nicotine  never used     NA   Other substances not listed above:  Identify:  never used     NA     Patient reported the following problems as a result of their substance use: none    Substance Use: No symptoms    Based on the negative CAGE score and clinical interview there  are not indications of drug or alcohol abuse.    Significant Losses / Trauma / Abuse / Neglect Issues:   Patient did not serve in the .  There are indications or report of significant loss, trauma, abuse or neglect issues related to:     Death of:  3 Siblings, including a brother that was killed in [place] in   Mother  when client was 18 years old from cancer that was undiagnosed    Trauma:  's pornography addiction throughout the past 44 years.  - father's break down following her mother's death    Concerns for possible neglect are not present.     Safety Assessment:   Patient denies current homicidal ideation and behaviors.  Patient denies current self-injurious ideation and behaviors.    Patient denied risk behaviors associated with substance use.   Patient denies any high risk behaviors associated with " mental health symptoms.  Patient reports the following current concerns for their personal safety: None.  Patient reports there are not firearms in the house.     History of Safety Concerns:  Patient denied a history of homicidal ideation.     Patient reported a history of personal safety concerns: living situation / [place]  Patient denied a history of assaultive behaviors.    Patient denied a history of sexual assault behaviors.     Patient denied a history of risk behaviors associated with substance use.  Patient denies any history of high risk behaviors associated with mental health symptoms.  Patient reports the following protective factors: dedication to family or friends; effectively controls impulses; regular physical activity; sense of belonging; help seeking behaviors when distressed; abstinence from substances; daily obligations; structured day; effective problem solving skills; commitment to well being; positive social skills; financial stability; strong sense of self worth or esteem; sense of personal control or determination    Risk Plan:  See Recommendations for Safety and Risk Management Plan    Review of Symptoms per patient report:   Depression: Change in sleep and Ruminations  Estrella:  No Symptoms  Psychosis: No Symptoms  Anxiety: Excessive worry, Nervousness, Sleep disturbance, and Ruminations  Panic:  No symptoms  Post Traumatic Stress Disorder:  Experienced traumatic event see trauma history    Eating Disorder: No Symptoms  ADD / ADHD:  No symptoms  Conduct Disorder: No symptoms  Autism Spectrum Disorder: No symptoms  Obsessive Compulsive Disorder: No Symptoms    Patient reports the following compulsive behaviors and treatment history:  None .      Diagnostic Criteria:   Adjustment Disorder  A. The development of emotional or behavioral symptoms in response to an identifiable stressor(s) occurring within 3 months of the onset of the stressor(s)  B. These symptoms  "or behaviors are clinically significant, as evidenced by one or both of the following:       - Significant impairment in social, occupational, or other important areas of functioning  C. The stress-related disturbance does not meet criteria for another disorder & is not not an exacerbation of another mental disorder  D. The symptoms do not represent normal bereavement  E. Once the stressor or its consequences have terminated, the symptoms do not persist for more than an additional 6 months       * Adjustment Disorder with Mixed Anxiety and Depressed Mood: The predominant manifestation is a combination of depression and anxiety    Functional Status:  Patient reports the following functional impairments:  relationship(s).       Clinical Summary:  1. Psychosocial, Cultural and Contextual Factors: The patient describes their cultural background as .  Cultural influences and impact on patient's life structure, values, norms, and healthcare: I'm female.  Client reported being spiritual.  Client spent 23 years in [place] and reported some of their traditions are important to her.  Contextual Factors: Individual Factors client wants to identify \"who am I\" since retiring, Family Factors client's 's history of addiction has had an impact on client and their marriage,  and Societal Factors [place] [employer] work in [place] has had some challenges due to culture differences.   2. Principal DSM5 Diagnoses  (Sustained by DSM5 Criteria Listed Above):   Adjustment Disorders  309.24 (F43.22) With anxiety and depressed mood  3. Other Diagnoses that is relevant to services:   None  4. Provisional Diagnosis:  None  5. Prognosis: Expect Improvement.  6. Likely consequences of symptoms if not treated: worsening symptoms.  7. Client strengths include:  educated, goal-focused, has a previous history of therapy, insightful, open to learning, support of family, " friends and providers, willing to ask questions, and work history .     Recommendations:     1. Plan for Safety and Risk Management:   Safety and Risk: Recommended that patient call 911 or go to the local ED should there be a change in any of these risk factors..          Report to child / adult protection services was NA.     2. Patient's identified mental health concerns with a cultural influence will be addressed by therapist using person centered approach .     3. Initial Treatment will focus on:    Adjustment Difficulties related to: family concerns.  Client is interested in EMDR therapy     4. Resources/Service Plan:    services are not indicated.   Modifications to assist communication are not indicated.   Additional disability accommodations are not indicated.      5. Collaboration:   Collaboration / coordination of treatment will be initiated with the following  support professionals: primary care physician.      6.  Referrals:   The following referral(s) will be initiated:  None .       A Release of Information has been obtained for the following:  NA .     Clinical Substantiation/medical necessity for the above recommendations:  client has history of trauma and significant loss and is impacted by symptoms due to these experiences.  She would likely benefit from additional support and learning strategies to manage symptoms.    7. RONNIE:    RONNIE:  No use    8. Records:   These were reviewed at time of assessment.   Information in this assessment was obtained from the medical record and  provided by patient who is a good historian.    Patient will have open access to their mental health medical record.    9.   Interactive Complexity: No    10. Safety Plan:  Patient has no change in safety concerns. Committed to safety and agreed to follow previously developed safety plan.    Provider Name/ Credentials:  Emilee Marcelo, [facility]  12/13/2023

## 2024-03-08 ENCOUNTER — HOSPITAL ENCOUNTER (OUTPATIENT)
Dept: MAMMOGRAPHY | Facility: CLINIC | Age: 69
Discharge: HOME | End: 2024-03-08
Attending: FAMILY MEDICINE | Admitting: FAMILY MEDICINE
Payer: MEDICARE

## 2024-03-08 DIAGNOSIS — Z12.31 VISIT FOR SCREENING MAMMOGRAM: ICD-10-CM

## 2024-03-08 PROCEDURE — 77063 BREAST TOMOSYNTHESIS BI: CPT

## 2024-05-06 ENCOUNTER — VIRTUAL VISIT (OUTPATIENT)
Dept: PSYCHOLOGY | Facility: CLINIC | Age: 69
End: 2024-05-06
Payer: MEDICARE

## 2024-05-06 DIAGNOSIS — F43.23 ADJUSTMENT DISORDER WITH MIXED ANXIETY AND DEPRESSED MOOD: Primary | ICD-10-CM

## 2024-05-06 PROCEDURE — 90834 PSYTX W PT 45 MINUTES: CPT | Mod: 95 | Performed by: SOCIAL WORKER

## 2024-05-06 NOTE — PROGRESS NOTES
[facility]                                     Progress Note    Patient Name: Yordan Enamorado  Date: 5/6/2024         Service Type: Individual      Session Start Time: 10:32 AM  Session End Time: 11:23 AM     Session Length: 38-52 minutes    Session #: 3    Attendees: Client attended alone    Service Modality:  Video Visit:      Provider verified identity through the following two step process.  Patient provided:  Patient is known previously to provider    Telemedicine Visit: The patient's condition can be safely assessed and treated via synchronous audio and visual telemedicine encounter.      Reason for Telemedicine Visit: Services only offered telehealth    Originating Site (Patient Location): Patient's home    Distant Site (Provider Location): Provider Remote Setting- Home Office    Consent:  The patient/guardian has verbally consented to: the potential risks and benefits of telemedicine (video visit) versus in person care; bill my insurance or make self-payment for services provided; and responsibility for payment of non-covered services.     Patient would like the video invitation sent by:  My Chart    Mode of Communication:  [facility]    Distant Location (Provider):  Off-site    As the provider I attest to compliance with applicable laws and regulations related to telemedicine.    DATA  Interactive Complexity: No  Crisis: No        Progress Since Last Session (Related to Symptoms / Goals / Homework):   Symptoms:  no change since previous appointment    Homework:  last appointment was 12/2023      Episode of Care Goals: No improvement - PREPARATION (Decided to change - considering how); Intervened by negotiating a change plan and determining options / strategies for behavior change, identifying triggers, exploring social supports, and working towards setting a date to begin behavior change     Current / Ongoing Stressors and Concerns:   Uncertainties about future; Who  are you since care home?   Past trauma     Treatment Objective(s) Addressed in This Session:   EMDR: phase 2     Intervention:   EMDR: provided psychoeducation   Co-developed treatment plan    Assessments completed prior to visit:  The following assessments were completed by patient for this visit:  None        ASSESSMENT: Current Emotional / Mental Status (status of significant symptoms):   Risk status (Self / Other harm or suicidal ideation)   Patient denies current fears or concerns for personal safety.   Patient denies current or recent suicidal ideation or behaviors.   Patient denies current or recent homicidal ideation or behaviors.   Patient denies current or recent self injurious behavior or ideation.   Patient denies other safety concerns.   Patient reports there has been no change in risk factors since their last session.     Patient reports there has been no change in protective factors since their last session.     Recommended that patient call 911 or go to the local ED should there be a change in any of these risk factors.     Appearance:   Appropriate    Eye Contact:   Fair    Psychomotor Behavior: Normal    Attitude:   Cooperative  Interested Pleasant   Orientation:   All   Speech    Rate / Production: Normal     Volume:  Normal    Mood:    Anxious    Affect:    Mood Congruent    Thought Content:  Clear    Thought Form:  Coherent  Goal Directed  Logical    Insight:    Good      Medication Review:   No current psychiatric medications prescribed     Medication Compliance:   NA     Changes in Health Issues:   None reported     Chemical Use Review:   Substance Use: no use     Tobacco Use: no use    Diagnosis:  Adjustment Disorders  309.24 (F43.22) With anxiety     Collateral Reports Completed:   Not Applicable    PLAN: (Patient Tasks / Therapist Tasks / Other)  EMDR: therapist sent information and encouraged her to review  Recommended book, The Body Keeps The Score  Client is on the waitlist for a sooner  appointment    Emilee Marcelo, [facility] 5/6/2024                                                         ______________________________________________________________________    Individual Treatment Plan    Patient's Name: Yordan Enamorado  YOB: 1955    Date of Creation: 5/6/2024  Date Treatment Plan Last Reviewed/Revised: 5/6/2024    DSM5 Diagnoses: Adjustment Disorders  309.24 (F43.22) With anxiety  Psychosocial / Contextual Factors: past trauma  PROMIS (reviewed every 90 days):     Assessments completed prior to visit:  The following assessments were completed by patient for this visit:  PROMIS 10-Global Health (only subscores and total score):       11/7/2023    10:01 AM 4/30/2024     9:59 AM   PROMIS-10 Scores Only   Global Mental Health Score 14 15   Global Physical Health Score 16 18   PROMIS TOTAL - SUBSCORES 30 33        Referral / Collaboration:  Referral to another professional/service is not indicated at this time..    Anticipated number of session for this episode of care:  will review in 90 days  Anticipation frequency of session: Every other week  Anticipated Duration of each session: 38-52 minutes  Treatment plan will be reviewed in 90 days or when goals have been changed.       MeasurableTreatment Goal(s) related to diagnosis / functional impairment(s)    Goal 1: Patient will be able to recall the traumatic events without becoming overwhelmed with negative thoughts, feelings, or urges.    Objective #A (Patient Action)    Status: New - Date: 5/6/2024    Patient will describe the history of and nature of trauma symptoms    Intervention(s)  Therapist will assess the client's frequency, intensity, duration, and history of trauma symptoms and their impact on functioning.    Objective #B (Patient Action)  Status: New Date: 5/6/2024    Patient will cooperate with eye movement desensitization and reprocessing (EMDR) to reduce emotional reaction to traumatic  event(s)    Intervention(s)  Therapist will utilize EMDR to reduce the client's emotional reactivity to the traumatic event(s).    Objective #C (Patient Action)  Status: New Date: 5/6/2024    Patient will learn and implement calming and coping strategies to manage trauma symptoms.    Intervention(s)  Therapist will teach grounding techniques, distress tolerance, and emotion regulation techniques.     Patient has reviewed and agreed to the above plan.      Emilee Marcelo, [facility]  May 6, 2024

## 2024-05-12 SDOH — HEALTH STABILITY: PHYSICAL HEALTH: ON AVERAGE, HOW MANY MINUTES DO YOU ENGAGE IN EXERCISE AT THIS LEVEL?: 60 MIN

## 2024-05-12 SDOH — HEALTH STABILITY: PHYSICAL HEALTH: ON AVERAGE, HOW MANY DAYS PER WEEK DO YOU ENGAGE IN MODERATE TO STRENUOUS EXERCISE (LIKE A BRISK WALK)?: 5 DAYS

## 2024-05-12 ASSESSMENT — SOCIAL DETERMINANTS OF HEALTH (SDOH): HOW OFTEN DO YOU GET TOGETHER WITH FRIENDS OR RELATIVES?: TWICE A WEEK

## 2024-05-13 ENCOUNTER — OFFICE VISIT (OUTPATIENT)
Dept: FAMILY MEDICINE | Facility: CLINIC | Age: 69
End: 2024-05-13
Attending: FAMILY MEDICINE
Payer: MEDICARE

## 2024-05-13 VITALS
BODY MASS INDEX: 20.25 KG/M2 | HEIGHT: 67 IN | OXYGEN SATURATION: 98 % | DIASTOLIC BLOOD PRESSURE: 56 MMHG | TEMPERATURE: 97.4 F | HEART RATE: 62 BPM | SYSTOLIC BLOOD PRESSURE: 100 MMHG | WEIGHT: 129 LBS

## 2024-05-13 DIAGNOSIS — Z00.00 ENCOUNTER FOR MEDICARE ANNUAL WELLNESS EXAM: Primary | ICD-10-CM

## 2024-05-13 DIAGNOSIS — Z11.59 NEED FOR HEPATITIS C SCREENING TEST: ICD-10-CM

## 2024-05-13 DIAGNOSIS — M85.89 OSTEOPENIA OF MULTIPLE SITES: ICD-10-CM

## 2024-05-13 DIAGNOSIS — E78.00 HIGH BLOOD CHOLESTEROL: ICD-10-CM

## 2024-05-13 PROBLEM — Z78.9 VEGAN DIET: Status: RESOLVED | Noted: 2023-04-24 | Resolved: 2024-05-13

## 2024-05-13 PROBLEM — K13.79 MOUTH SORE: Status: RESOLVED | Noted: 2021-10-11 | Resolved: 2024-05-13

## 2024-05-13 PROBLEM — E55.9 VITAMIN D DEFICIENCY: Status: RESOLVED | Noted: 2023-04-24 | Resolved: 2024-05-13

## 2024-05-13 PROBLEM — J34.89 NASAL SORE: Status: RESOLVED | Noted: 2021-10-11 | Resolved: 2024-05-13

## 2024-05-13 PROBLEM — L91.0 KELOID SCAR: Status: RESOLVED | Noted: 2021-10-11 | Resolved: 2024-05-13

## 2024-05-13 PROBLEM — G25.9 ABNORMAL LEG MOVEMENT: Status: RESOLVED | Noted: 2021-10-11 | Resolved: 2024-05-13

## 2024-05-13 PROBLEM — R35.89 POLYURIA: Status: RESOLVED | Noted: 2023-04-24 | Resolved: 2024-05-13

## 2024-05-13 LAB
ALBUMIN SERPL BCG-MCNC: 4.3 G/DL (ref 3.5–5.2)
ALP SERPL-CCNC: 61 U/L (ref 40–150)
ALT SERPL W P-5'-P-CCNC: 20 U/L (ref 0–50)
ANION GAP SERPL CALCULATED.3IONS-SCNC: 10 MMOL/L (ref 7–15)
AST SERPL W P-5'-P-CCNC: 27 U/L (ref 0–45)
BILIRUB SERPL-MCNC: 0.3 MG/DL
BUN SERPL-MCNC: 16 MG/DL (ref 8–23)
CALCIUM SERPL-MCNC: 9.4 MG/DL (ref 8.8–10.2)
CHLORIDE SERPL-SCNC: 102 MMOL/L (ref 98–107)
CHOLEST SERPL-MCNC: 225 MG/DL
CREAT SERPL-MCNC: 0.84 MG/DL (ref 0.51–0.95)
DEPRECATED HCO3 PLAS-SCNC: 28 MMOL/L (ref 22–29)
EGFRCR SERPLBLD CKD-EPI 2021: 75 ML/MIN/1.73M2
ERYTHROCYTE [DISTWIDTH] IN BLOOD BY AUTOMATED COUNT: 12.7 % (ref 10–15)
FASTING STATUS PATIENT QL REPORTED: ABNORMAL
FASTING STATUS PATIENT QL REPORTED: ABNORMAL
GLUCOSE SERPL-MCNC: 106 MG/DL (ref 70–99)
HCT VFR BLD AUTO: 39.3 % (ref 35–47)
HCV AB SERPL QL IA: NONREACTIVE
HDLC SERPL-MCNC: 75 MG/DL
HGB BLD-MCNC: 12.6 G/DL (ref 11.7–15.7)
LDLC SERPL CALC-MCNC: 123 MG/DL
MCH RBC QN AUTO: 31.6 PG (ref 26.5–33)
MCHC RBC AUTO-ENTMCNC: 32.1 G/DL (ref 31.5–36.5)
MCV RBC AUTO: 99 FL (ref 78–100)
NONHDLC SERPL-MCNC: 150 MG/DL
PLATELET # BLD AUTO: 215 10E3/UL (ref 150–450)
POTASSIUM SERPL-SCNC: 4.8 MMOL/L (ref 3.4–5.3)
PROT SERPL-MCNC: 7.2 G/DL (ref 6.4–8.3)
RBC # BLD AUTO: 3.99 10E6/UL (ref 3.8–5.2)
SODIUM SERPL-SCNC: 140 MMOL/L (ref 135–145)
TRIGL SERPL-MCNC: 133 MG/DL
TSH SERPL DL<=0.005 MIU/L-ACNC: 1.68 UIU/ML (ref 0.3–4.2)
WBC # BLD AUTO: 9.1 10E3/UL (ref 4–11)

## 2024-05-13 PROCEDURE — 80061 LIPID PANEL: CPT | Performed by: FAMILY MEDICINE

## 2024-05-13 PROCEDURE — 80053 COMPREHEN METABOLIC PANEL: CPT | Performed by: FAMILY MEDICINE

## 2024-05-13 PROCEDURE — 84443 ASSAY THYROID STIM HORMONE: CPT | Performed by: FAMILY MEDICINE

## 2024-05-13 PROCEDURE — 86803 HEPATITIS C AB TEST: CPT | Performed by: FAMILY MEDICINE

## 2024-05-13 PROCEDURE — 85027 COMPLETE CBC AUTOMATED: CPT | Performed by: FAMILY MEDICINE

## 2024-05-13 PROCEDURE — G0439 PPPS, SUBSEQ VISIT: HCPCS | Performed by: FAMILY MEDICINE

## 2024-05-13 PROCEDURE — 99214 OFFICE O/P EST MOD 30 MIN: CPT | Mod: 25 | Performed by: FAMILY MEDICINE

## 2024-05-13 PROCEDURE — 36415 COLL VENOUS BLD VENIPUNCTURE: CPT | Performed by: FAMILY MEDICINE

## 2024-05-13 RX ORDER — RESPIRATORY SYNCYTIAL VIRUS VACCINE 120MCG/0.5
0.5 KIT INTRAMUSCULAR ONCE
Qty: 1 EACH | Refills: 0 | Status: CANCELLED | OUTPATIENT
Start: 2024-05-13 | End: 2024-05-13

## 2024-05-13 NOTE — PROGRESS NOTES
Preventive Care Visit  [facility]  Kait Cobb MD, Family Medicine  May 13, 2024      Assessment & Plan     (Z00.00) Encounter for Medicare annual wellness exam  (primary encounter diagnosis)  Comment: encourage annual wellness visit and vaccine up date  Plan: pt will do vaccine at pharmacy due to the cost    (Z11.59) Need for hepatitis C screening test  Comment:   Plan: Hepatitis C Screen Reflex to HCV RNA Quant and         Genotype            (M85.89) Osteopenia of multiple sites  Comment: reviewed the chart pt has osteopenia at 2021. Former smoker and normal low bmi. No history of fracture. She takes vitamins.   Plan: DEXA HIP/PELVIS/SPINE - Future        Advise regular exercise and take otc multiply vitamins with minerals. Fall precaution.     (E78.00) High blood cholesterol  Comment: reviewed the chart LDL was elevated at 2023.   Plan: Lipid panel reflex to direct LDL Non-fasting,         Comprehensive metabolic panel (BMP + Alb, Alk         Phos, ALT, AST, Total. Bili, TP), TSH with free        T4 reflex, CBC with platelets        Advise low fat diet. Will follow-up lab.     Patient has been advised of split billing requirements and indicates understanding: Yes          Counseling  Appropriate preventive services were discussed with this patient, including applicable screening as appropriate for fall prevention, nutrition, physical activity, Tobacco-use cessation, weight loss and cognition.  Checklist reviewing preventive services available has been given to the patient.  Reviewed patient's diet, addressing concerns and/or questions.   She is at risk for psychosocial distress and has been provided with information to reduce risk.       See Patient Instructions    Al Farr is a 68 year old, presenting for the following:  Wellness Visit (Nonfasting )        5/13/2024     9:15 AM   Additional Questions   Roomed by [facility]       Health Care Directive  Patient has a Health  Care Directive on file  Discussed advance care planning with patient.    HPI      Hyperlipidemia Follow-Up    Are you regularly taking any medication or supplement to lower your cholesterol?   No  Are you having muscle aches or other side effects that you think could be caused by your cholesterol lowering medication?  No        5/12/2024   General Health   How would you rate your overall physical health? Excellent   Feel stress (tense, anxious, or unable to sleep) Only a little   (!) STRESS CONCERN      5/12/2024   Nutrition   Diet: Regular (no restrictions)         5/12/2024   Exercise   Days per week of moderate/strenous exercise 5 days   Average minutes spent exercising at this level 60 min         5/12/2024   Social Factors   Frequency of gathering with friends or relatives Twice a week   Worry food won't last until get money to buy more No   Food not last or not have enough money for food? No   Do you have housing?  Yes   Are you worried about losing your housing? No   Lack of transportation? No   Unable to get utilities (heat,electricity)? No         5/13/2024   Fall Risk   Fallen 2 or more times in the past year? No   Trouble with walking or balance? No          5/12/2024   Activities of Daily Living- Home Safety   Needs help with the following daily activites None of the above   Safety concerns in the home None of the above         5/12/2024   Dental   Dentist two times every year? Yes         5/12/2024   Hearing Screening   Hearing concerns? None of the above         5/12/2024   Driving Risk Screening   Patient/family members have concerns about driving No         5/12/2024   General Alertness/Fatigue Screening   Have you been more tired than usual lately? No         5/12/2024   Urinary Incontinence Screening   Bothered by leaking urine in past 6 months No         5/12/2024   TB Screening   Were you born outside of the [place]? No         Today's PHQ-2 Score:       5/12/2024     5:13 PM   PHQ-2 ( 1999 [employer])    Q1: Little interest or pleasure in doing things 0   Q2: Feeling down, depressed or hopeless 0   PHQ-2 Score 0   Q1: Little interest or pleasure in doing things Not at all   Q2: Feeling down, depressed or hopeless Not at all   PHQ-2 Score 0           5/12/2024   Substance Use   Alcohol more than 3/day or more than 7/wk No   Do you have a current opioid prescription? No   How severe/bad is pain from 1 to 10? 0/10 (No Pain)   Do you use any other substances recreationally? No     Social History     Tobacco Use    Smoking status: Former     Current packs/day: 1.00     Average packs/day: 1 pack/day for 10.0 years (10.0 ttl pk-yrs)     Types: Cigarettes     Passive exposure: Past    Smokeless tobacco: Never    Tobacco comments:     my history is with my father smoking in our home   Vaping Use    Vaping status: Never Used   Substance Use Topics    Alcohol use: Not Currently    Drug use: Never           3/8/2024   LAST FHS-7 RESULTS   1st degree relative breast or ovarian cancer No   Any relative bilateral breast cancer No   Any male have breast cancer No   Any ONE woman have BOTH breast AND ovarian cancer No   Any woman with breast cancer before 50yrs No   2 or more relatives with breast AND/OR ovarian cancer No   2 or more relatives with breast AND/OR bowel cancer No        Mammogram Screening - Mammogram every 1-2 years updated in Health Maintenance based on mutual decision making    ASCVD Risk   The 10-year ASCVD risk score (Sherry HIGUERA, et al., 2019) is: 4.4%    Values used to calculate the score:      Age: 68 years      Sex: Female      Is Non- : No      Diabetic: No      Tobacco smoker: No      Systolic Blood Pressure: 100 mmHg      Is BP treated: No      HDL Cholesterol: 78 mg/dL      Total Cholesterol: 210 mg/dL            Reviewed and updated as needed this visit by Provider   Tobacco  Allergies  Meds  Problems  Med Hx  Surg Hx  Fam Hx            Lab work is in process  Labs  "reviewed in [facility]  Current providers sharing in care for this patient include:  Patient Care Team:  Malathi Singh MD as PCP - General (Family Medicine)  Malathi Singh MD as Assigned PCP  No Ref-Primary, Physician  Paul Wade MD as MD (Dermatology)  Rina Joiner MD as Referring Physician (Family Medicine)  Kurt Arellano MD as Assigned Surgical Provider  Daniel Barrett MD as MD (Dermatology)  Emilee Marcelo LICSW as Assigned Behavioral Health Provider    The following health maintenance items are reviewed in Epic and correct as of today:  Health Maintenance   Topic Date Due    HEPATITIS C SCREENING  Never done    DTAP/TDAP/TD IMMUNIZATION (1 - Tdap) Never done    RSV VACCINE (Pregnancy & 60+) (1 - 1-dose 60+ series) Never done    Pneumococcal Vaccine: 65+ Years (1 of 1 - PCV) Never done    COVID-19 Vaccine (4 - 2023-24 season) 09/01/2023    DEXA  10/21/2023    ANNUAL REVIEW OF HM ORDERS  04/24/2024    MEDICARE ANNUAL WELLNESS VISIT  04/24/2024    INFLUENZA VACCINE (Season Ended) 09/01/2024    COLORECTAL CANCER SCREENING  10/18/2024    FALL RISK ASSESSMENT  05/13/2025    MAMMO SCREENING  03/08/2026    GLUCOSE  04/24/2026    LIPID  04/24/2028    ADVANCE CARE PLANNING  06/27/2028    PHQ-2 (once per calendar year)  Completed    ZOSTER IMMUNIZATION  Completed    IPV IMMUNIZATION  Aged Out    HPV IMMUNIZATION  Aged Out    MENINGITIS IMMUNIZATION  Aged Out    RSV MONOCLONAL ANTIBODY  Aged Out    LUNG CANCER SCREENING  Discontinued         Review of Systems  Constitutional, HEENT, cardiovascular, pulmonary, GI, , musculoskeletal, neuro, skin, endocrine and psych systems are negative, except as otherwise noted.     Objective    Exam  /56 (BP Location: Right arm, Patient Position: Sitting, Cuff Size: Adult Regular)   Pulse 62   Temp 97.4  F (36.3  C) (Oral)   Ht 1.689 m (5' 6.5\")   Wt 58.5 kg (129 lb)   SpO2 98%   Breastfeeding No   BMI 20.51 kg/m     Estimated body mass index is " "20.51 kg/m  as calculated from the following:    Height as of this encounter: 1.689 m (5' 6.5\").    Weight as of this encounter: 58.5 kg (129 lb).    Physical Exam  GENERAL: alert and no distress  EYES: Eyes grossly normal to inspection, PERRL and conjunctivae and sclerae normal  HENT: ear canals and TM's normal, nose and mouth without ulcers or lesions  NECK: no adenopathy, no asymmetry, masses, or scars  RESP: lungs clear to auscultation - no rales, rhonchi or wheezes  BREAST: normal without masses, tenderness or nipple discharge and no palpable axillary masses or adenopathy  CV: regular rate and rhythm, normal S1 S2, no S3 or S4, no murmur, click or rub, no peripheral edema  ABDOMEN: soft, nontender, no hepatosplenomegaly, no masses and bowel sounds normal  MS: no gross musculoskeletal defects noted, no edema  SKIN: no suspicious lesions or rashes  NEURO: Normal strength and tone, mentation intact and speech normal  PSYCH: mentation appears normal, affect normal/bright        5/13/2024   Mini Cog   Clock Draw Score 2 Normal   3 Item Recall 3 objects recalled   Mini Cog Total Score 5             10/11/2021   Vision Screen   Vision Screen Results REFER       Signed Electronically by: Kait Cobb MD    "

## 2024-06-13 ENCOUNTER — ANCILLARY PROCEDURE (OUTPATIENT)
Dept: BONE DENSITY | Facility: CLINIC | Age: 69
End: 2024-06-13
Attending: FAMILY MEDICINE
Payer: MEDICARE

## 2024-06-13 DIAGNOSIS — M85.89 OSTEOPENIA OF MULTIPLE SITES: ICD-10-CM

## 2024-06-13 PROCEDURE — 77080 DXA BONE DENSITY AXIAL: CPT | Mod: TC | Performed by: RADIOLOGY

## 2024-06-13 PROCEDURE — 77089 TBS DXA CAL W/I&R FX RISK: CPT | Performed by: RADIOLOGY

## 2024-06-25 ENCOUNTER — VIRTUAL VISIT (OUTPATIENT)
Dept: PSYCHOLOGY | Facility: CLINIC | Age: 69
End: 2024-06-25
Payer: MEDICARE

## 2024-06-25 DIAGNOSIS — F43.23 ADJUSTMENT DISORDER WITH MIXED ANXIETY AND DEPRESSED MOOD: Primary | ICD-10-CM

## 2024-06-25 PROCEDURE — 90834 PSYTX W PT 45 MINUTES: CPT | Mod: 95 | Performed by: SOCIAL WORKER

## 2024-06-25 NOTE — PROGRESS NOTES
[facility]                                     Progress Note    Patient Name: Yordan Enamorado  Date: 6/25/2024     Service Type: Individual      Session Start Time: 8:36 AM  Session End Time: 9:19 AM     Session Length: 38-52 minutes    Session #: 4    Attendees: Client attended alone    Service Modality:  Video Visit:      Provider verified identity through the following two step process.  Patient provided:  Patient is known previously to provider    Telemedicine Visit: The patient's condition can be safely assessed and treated via synchronous audio and visual telemedicine encounter.      Reason for Telemedicine Visit: Services only offered telehealth    Originating Site (Patient Location): Patient's home    Distant Site (Provider Location): Provider Remote Setting- Home Office    Consent:  The patient/guardian has verbally consented to: the potential risks and benefits of telemedicine (video visit) versus in person care; bill my insurance or make self-payment for services provided; and responsibility for payment of non-covered services.     Patient would like the video invitation sent by:  My Chart    Mode of Communication:  Video Conference [facility]    Distant Location (Provider):  Off-site    As the provider I attest to compliance with applicable laws and regulations related to telemedicine.    DATA  Interactive Complexity: No  Crisis: No        Progress Since Last Session (Related to Symptoms / Goals / Homework):   Symptoms:  worsening symptoms, client reported anxiety symptoms, difficulty controlling rumination, impacting ability to fall back to sleep   Picking at cuticles     Homework:  completed/achieved      Episode of Care Goals: No improvement - ACTION (Actively working towards change); Intervened by reinforcing change plan / affirming steps taken     Current / Ongoing Stressors and Concerns:   Uncertainties about future; Who are you since shelter?   Past trauma   Dynamics in  relationship with      Treatment Objective(s) Addressed in This Session:   use at least 1 coping skills for anxiety management in the next 3 weeks  Establish healthy boundaries  Use cognitive strategies to manage thoughts/fears that contribute to anxiety symptoms     Intervention:   Modeled boundary setting   Identified current symptoms, impact on functioning, and discussed coping strategies   CBT: identified cognitive distortion, all or nothing thinking    Assessments completed prior to visit:  The following assessments were completed by patient for this visit:  None        ASSESSMENT: Current Emotional / Mental Status (status of significant symptoms):   Risk status (Self / Other harm or suicidal ideation)   Patient denies current fears or concerns for personal safety.   Patient denies current or recent suicidal ideation or behaviors.   Patient denies current or recent homicidal ideation or behaviors.   Patient denies current or recent self injurious behavior or ideation.   Patient denies other safety concerns.   Patient reports there has been no change in risk factors since their last session.     Patient reports there has been no change in protective factors since their last session.     Recommended that patient call 911 or go to the local ED should there be a change in any of these risk factors.     Appearance:   Appropriate    Eye Contact:   Poor   Psychomotor Behavior: Normal    Attitude:   Cooperative  Interested Pleasant   Orientation:   All   Speech    Rate / Production: Normal     Volume:  Normal    Mood:    Anxious    Affect:    Mood Congruent    Thought Content:  Clear    Thought Form:  Coherent  Tangential    Insight:    Good      Medication Review:   No current psychiatric medications prescribed     Medication Compliance:   NA     Changes in Health Issues:   None reported     Chemical Use Review:   Substance Use: no use     Tobacco Use: no use    Diagnosis:  Adjustment Disorders  309.24 (F43.22)  With anxiety     Collateral Reports Completed:   Not Applicable    PLAN: (Patient Tasks / Therapist Tasks / Other)  EMDR: client reviewed information  Therapist to engage client in completion of PCL-5, provided psychoeducation on window of tolerance.  Client will continue to engage in meditation.  Therapist sent client grounding techniques.    Emilee Marcelo, [facility] 6/25/2024                                               ______________________________________________________________________    Individual Treatment Plan    Patient's Name: Yordan Enamorado  YOB: 1955    Date of Creation: 5/6/2024  Date Treatment Plan Last Reviewed/Revised: 5/6/2024    DSM5 Diagnoses: Adjustment Disorders  309.24 (F43.22) With anxiety  Psychosocial / Contextual Factors: past trauma  PROMIS (reviewed every 90 days):     Assessments completed prior to visit:  The following assessments were completed by patient for this visit:  PROMIS 10-Global Health (only subscores and total score):       11/7/2023    10:01 AM 4/30/2024     9:59 AM   PROMIS-10 Scores Only   Global Mental Health Score 14 15   Global Physical Health Score 16 18   PROMIS TOTAL - SUBSCORES 30 33        Referral / Collaboration:  Referral to another professional/service is not indicated at this time..    Anticipated number of session for this episode of care:  will review in 90 days  Anticipation frequency of session: Every other week  Anticipated Duration of each session: 38-52 minutes  Treatment plan will be reviewed in 90 days or when goals have been changed.       MeasurableTreatment Goal(s) related to diagnosis / functional impairment(s)    Goal 1: Patient will be able to recall the traumatic events without becoming overwhelmed with negative thoughts, feelings, or urges.    Objective #A (Patient Action)    Status: New - Date: 5/6/2024    Patient will describe the history of and nature of trauma symptoms    Intervention(s)  Therapist will assess the  client's frequency, intensity, duration, and history of trauma symptoms and their impact on functioning.    Objective #B (Patient Action)  Status: New Date: 5/6/2024    Patient will cooperate with eye movement desensitization and reprocessing (EMDR) to reduce emotional reaction to traumatic event(s)    Intervention(s)  Therapist will utilize EMDR to reduce the client's emotional reactivity to the traumatic event(s).    Objective #C (Patient Action)  Status: New Date: 5/6/2024    Patient will learn and implement calming and coping strategies to manage trauma symptoms.    Intervention(s)  Therapist will teach grounding techniques, distress tolerance, and emotion regulation techniques.     Patient has reviewed and agreed to the above plan.      Emilee Marcelo, [facility]  May 6, 2024

## 2024-07-16 ENCOUNTER — VIRTUAL VISIT (OUTPATIENT)
Dept: PSYCHOLOGY | Facility: CLINIC | Age: 69
End: 2024-07-16
Payer: MEDICARE

## 2024-07-16 DIAGNOSIS — F43.23 ADJUSTMENT DISORDER WITH MIXED ANXIETY AND DEPRESSED MOOD: Primary | ICD-10-CM

## 2024-07-16 PROCEDURE — 90834 PSYTX W PT 45 MINUTES: CPT | Mod: 95 | Performed by: SOCIAL WORKER

## 2024-07-16 NOTE — PROGRESS NOTES
[facility]                                     Progress Note    Patient Name: Yordan Enamorado  Date: 7/16/2024     Service Type: Individual      Session Start Time: 8:36 AM Session End Time: 9:20 AM     Session Length: 38-52 minutes    Session #: 5    Attendees: Client attended alone    Service Modality:  Video Visit:      Provider verified identity through the following two step process.  Patient provided:  Patient is known previously to provider    Telemedicine Visit: The patient's condition can be safely assessed and treated via synchronous audio and visual telemedicine encounter.      Reason for Telemedicine Visit: Services only offered telehealth    Originating Site (Patient Location): Patient's home    Distant Site (Provider Location): Provider Remote Setting- Home Office    Consent:  The patient/guardian has verbally consented to: the potential risks and benefits of telemedicine (video visit) versus in person care; bill my insurance or make self-payment for services provided; and responsibility for payment of non-covered services.     Patient would like the video invitation sent by:  My Chart    Mode of Communication:  [facility]    Distant Location (Provider):  Off-site    As the provider I attest to compliance with applicable laws and regulations related to telemedicine.    DATA  Interactive Complexity: No  Crisis: No        Progress Since Last Session (Related to Symptoms / Goals / Homework):   Symptoms:  no change since previous appointment    Homework:  partially completed      Episode of Care Goals: No improvement - PREPARATION (Decided to change - considering how); Intervened by negotiating a change plan and determining options / strategies for behavior change, identifying triggers, exploring social supports, and working towards setting a date to begin behavior change     Current / Ongoing Stressors and Concerns:   Uncertainties about future; Who are you since  residential?   Past trauma   Dynamics in relationship with    Concerns about politics     Treatment Objective(s) Addressed in This Session:   EMDR: phase 2  Use a minimum of 1 strategy to manage symptoms     Intervention:   Reviewed PCL-5   Engaged client in grounding exercise   EMDR: engaged client in completion of SHAYLA II (scored: 0), provided psychoeducation on window of tolerance    Assessments completed prior to visit:  The following assessments were completed by patient for this visit:     07/16/24 0962   PCL-5: In the past month, how much were you bothered by:   Repeated, disturbing, and unwanted memories of the stressful experience? 1   Repeated, disturbing dreams of the stressful experience? 0   Suddenly feeling or acting as if the stressful experience were actually happening again (as if you were actually back there reliving it)? 1   Feeling very upset when something reminded you of the stressful experience? 1   Having strong physical reactions when something reminded you of the stressful experience (for example, heart pounding, trouble breathing, sweating)? 0   Avoiding memories, thoughts, or feelings related to the stressful experience? 1   Avoiding external reminders of the stressful experience (for example, people, places, conversations, activities, objects, or situations)? 0   Trouble remembering important parts of the stressful experience? 0   Having strong negative beliefs about yourself, other people, or the world (for example, having thoughts such as: I am bad, there is something seriously wrong with me, no one can be trusted, the world is completely dangerous)? 0   Blaming yourself or someone else for the stressful experience or what happened after it? 0   Having strong negative feelings such as fear, horror, anger, guilt, or shame? 0   Loss of interest in activities that you used to enjoy? 0   Feeling distant or cut off from other people? 0   Trouble experiencing positive feelings (for  "example, being unable to feel happiness or have loving feelings for people close to you)? 0   Irritable behavior, angry outbursts, or acting aggressively? 0   Taking too many risks or doing things that could cause you harm? 0   Being \"superalert\" or watchful or on guard? 0   Feeling jumpy or easily startled? 0   Having difficulty concentrating? 0   Trouble falling or staying asleep? 0   Post-Traumatic Stress Disorder Total Score 4           ASSESSMENT: Current Emotional / Mental Status (status of significant symptoms):   Risk status (Self / Other harm or suicidal ideation)   Patient denies current fears or concerns for personal safety.   Patient denies current or recent suicidal ideation or behaviors.   Patient denies current or recent homicidal ideation or behaviors.   Patient denies current or recent self injurious behavior or ideation.   Patient denies other safety concerns.   Patient reports there has been no change in risk factors since their last session.     Patient reports there has been no change in protective factors since their last session.     Recommended that patient call 911 or go to the local ED should there be a change in any of these risk factors.     Appearance:   Appropriate    Eye Contact:   Fair    Psychomotor Behavior: Normal    Attitude:   Cooperative  Interested   Orientation:   All   Speech    Rate / Production: Normal     Volume:  Normal    Mood:    Anxious    Affect:    Mood Congruent    Thought Content:  Clear    Thought Form:  Coherent    Insight:    Fair      Medication Review:   No current psychiatric medications prescribed     Medication Compliance:   NA     Changes in Health Issues:   None reported     Chemical Use Review:   Substance Use: no use     Tobacco Use: no use    Diagnosis:  Adjustment Disorders  309.24 (F43.22) With anxiety     Collateral Reports Completed:   Not Applicable    PLAN: (Patient Tasks / Therapist Tasks / Other)  EMDR: complete history taking, engage in " container and safe/calm state exercises  Client will continue to engage in meditation.  Therapist previously sent client grounding techniques.  Therapist encouraged increased awareness of window of tolerance and use of meditation, grounding techniques.      Emilee Marcelo, [facility] 7/16/2024                                          ______________________________________________________________________    Individual Treatment Plan    Patient's Name: Yordan Enamorado  YOB: 1955    Date of Creation: 5/6/2024  Date Treatment Plan Last Reviewed/Revised: 5/6/2024    DSM5 Diagnoses: Adjustment Disorders  309.24 (F43.22) With anxiety  Psychosocial / Contextual Factors: past trauma  PROMIS (reviewed every 90 days):     Assessments completed prior to visit:  The following assessments were completed by patient for this visit:  PROMIS 10-Global Health (only subscores and total score):       11/7/2023    10:01 AM 4/30/2024     9:59 AM   PROMIS-10 Scores Only   Global Mental Health Score 14 15   Global Physical Health Score 16 18   PROMIS TOTAL - SUBSCORES 30 33        Referral / Collaboration:  Referral to another professional/service is not indicated at this time..    Anticipated number of session for this episode of care:  will review in 90 days  Anticipation frequency of session: Every other week  Anticipated Duration of each session: 38-52 minutes  Treatment plan will be reviewed in 90 days or when goals have been changed.       MeasurableTreatment Goal(s) related to diagnosis / functional impairment(s)    Goal 1: Patient will be able to recall the traumatic events without becoming overwhelmed with negative thoughts, feelings, or urges.    Objective #A (Patient Action)    Status: New - Date: 5/6/2024    Patient will describe the history of and nature of trauma symptoms    Intervention(s)  Therapist will assess the client's frequency, intensity, duration, and history of trauma symptoms and their impact on  functioning.    Objective #B (Patient Action)  Status: New Date: 5/6/2024    Patient will cooperate with eye movement desensitization and reprocessing (EMDR) to reduce emotional reaction to traumatic event(s)    Intervention(s)  Therapist will utilize EMDR to reduce the client's emotional reactivity to the traumatic event(s).    Objective #C (Patient Action)  Status: New Date: 5/6/2024    Patient will learn and implement calming and coping strategies to manage trauma symptoms.    Intervention(s)  Therapist will teach grounding techniques, distress tolerance, and emotion regulation techniques.     Patient has reviewed and agreed to the above plan.      Emilee Marcelo, [facility]  May 6, 2024

## 2024-08-29 ENCOUNTER — OFFICE VISIT (OUTPATIENT)
Dept: OPTOMETRY | Facility: CLINIC | Age: 69
End: 2024-08-29
Payer: MEDICARE

## 2024-08-29 DIAGNOSIS — H52.03 HYPEROPIA OF BOTH EYES WITH ASTIGMATISM: Primary | ICD-10-CM

## 2024-08-29 DIAGNOSIS — H52.203 HYPEROPIA OF BOTH EYES WITH ASTIGMATISM: Primary | ICD-10-CM

## 2024-08-29 DIAGNOSIS — H26.9 BILATERAL INCIPIENT CATARACTS: ICD-10-CM

## 2024-08-29 DIAGNOSIS — H52.4 PRESBYOPIA: ICD-10-CM

## 2024-08-29 DIAGNOSIS — H04.129 DRY EYE: ICD-10-CM

## 2024-08-29 PROCEDURE — 92015 DETERMINE REFRACTIVE STATE: CPT | Mod: GY | Performed by: OPTOMETRIST

## 2024-08-29 PROCEDURE — 92004 COMPRE OPH EXAM NEW PT 1/>: CPT | Performed by: OPTOMETRIST

## 2024-08-29 ASSESSMENT — REFRACTION_MANIFEST
OD_AXIS: 018
OD_AXIS: 025
OS_AXIS: 170
OS_ADD: +2.50
OS_CYLINDER: +0.50
OD_CYLINDER: +0.75
OD_CYLINDER: +0.50
OS_SPHERE: +0.50
OD_SPHERE: +1.50
METHOD_AUTOREFRACTION: 1
OS_CYLINDER: +0.50
OD_SPHERE: +1.00
OS_SPHERE: +0.75
OS_AXIS: 164
OD_ADD: +2.50

## 2024-08-29 ASSESSMENT — REFRACTION_WEARINGRX
OS_AXIS: 180
OD_SPHERE: +0.25
OD_AXIS: 020
OD_ADD: +2.25
SPECS_TYPE: PAL
OS_SPHERE: +0.75
OS_CYLINDER: +0.25
OS_ADD: +2.25
OD_CYLINDER: +0.75

## 2024-08-29 ASSESSMENT — CUP TO DISC RATIO
OS_RATIO: 0.25
OD_RATIO: 0.25

## 2024-08-29 ASSESSMENT — VISUAL ACUITY
OD_PH_SC: 20/25
OD_SC+: -2
OS_SC: 20/30
METHOD: SNELLEN - LINEAR
OS_SC: 20/200
OD_SC: 20/100
OD_SC: 20/50

## 2024-08-29 ASSESSMENT — CONF VISUAL FIELD
OS_INFERIOR_TEMPORAL_RESTRICTION: 0
OD_INFERIOR_TEMPORAL_RESTRICTION: 0
OS_NORMAL: 1
OS_SUPERIOR_NASAL_RESTRICTION: 0
OD_NORMAL: 1
OD_SUPERIOR_NASAL_RESTRICTION: 0
METHOD: COUNTING FINGERS
OS_INFERIOR_NASAL_RESTRICTION: 0
OD_INFERIOR_NASAL_RESTRICTION: 0
OS_SUPERIOR_TEMPORAL_RESTRICTION: 0
OD_SUPERIOR_TEMPORAL_RESTRICTION: 0

## 2024-08-29 ASSESSMENT — SLIT LAMP EXAM - LIDS
COMMENTS: MEIBOMIAN GLAND DYSFUNCTION
COMMENTS: MEIBOMIAN GLAND DYSFUNCTION

## 2024-08-29 ASSESSMENT — TONOMETRY
OS_IOP_MMHG: 14
IOP_METHOD: APPLANATION
OD_IOP_MMHG: 14

## 2024-08-29 ASSESSMENT — EXTERNAL EXAM - RIGHT EYE: OD_EXAM: NORMAL

## 2024-08-29 ASSESSMENT — EXTERNAL EXAM - LEFT EYE: OS_EXAM: NORMAL

## 2024-08-29 NOTE — PATIENT INSTRUCTIONS
DRY EYE TREATMENT    I recommend using artificial tears for your dry eye. There are over the counter drops that work well and may be used up to 4x daily. ( systane balance, complete or ultra, blink, refresh optive, soothe xp, theratears) stay away from any red eye relief products such as visine and clear eyes. Lumify can help with redness used once daily.     If you need more than 4 drops daily, use a preservative free product which come in individual vials and may be used for up to 24 hours and then discarded.   The more severe the dry eye, the more frequent instillation of artificial tears that are needed to reduce irritation/ inflammation. (Sometimes every 1-2 hours for a couple of days).  You can also add a lubricating ointment in the lower lid at bedtime. ( over the counter refresh pm, genteal gel, lacrilube etc.)    Artificial tears work best as a preventative and not as well after your eyes are starting to bother you and/ or are red.  It may take 4- 6 weeks of using the drops before you notice improvement.  If after that time you are still having problems schedule an appointment for an evaluation to discuss different treatments.    Dry eyes are a chronic condition and you may have more symptoms at certain times of the year.      Additional recommended treatment:  Warm compresses once to twice daily for 5-10 minutes  Directions for warm soaks  There are few methods for hot compresses. Moisten a washcloth with hot water, or microwave for 10 seconds, being careful to not get the cloth too hot.   Then put the washcloth onto your eyelids for 5 minutes. It will cool quickly so a rice pack or eyemask that can be heated and laid on top of the washcloth will help retain the heat.   Lid cleansing    Overabundance of bacterial microorganisms along the eyelashes and lid margins induce stress on the tear film and promote inflammation.  Regular lid hygiene helps diminish the bacterial population to prevent inflammation  and infection.  Use a warm compress to loosen any crusts   Cleanse lids once daily with a lid cleansing product as directed such as Ocusoft or Sterilid which can be purchased at most pharmacies. Diluted baby shampoo will also work, but not as well as it dries the skin and can irritate eyelids.  Hypo chlor spray may also be used on closed lids.    Blink regularly  Reduce screen time if possible  Stay hydrated  Increase humidity  Wear sunglasses   No smoking/ vaping   Replace cosmetics every few months and remove daily  Avoid direct exposure to forced air--turn air vents in the car away and keep fans from blowing directly on your face

## 2024-08-29 NOTE — PROGRESS NOTES
Chief Complaint   Patient presents with    Annual Eye Exam        Last Eye Exam: 2-3 years ago   Dilated Previously: Yes, side effects of dilation explained today    What are you currently using to see?  Readers - got a PAL at last exam and never wore it because she said it does not work        Distance Vision Acuity: Satisfied with vision    Near Vision Acuity: Satisfied with vision while reading and using computer with readers    Eye Comfort: dry  Do you use eye drops? : No - bought artificial tears but not using them yet      Kamla Ulloa - Optometric Assistant           Medical, surgical and family histories reviewed and updated 8/29/2024.     History of melanoma     OBJECTIVE: See Ophthalmology exam    ASSESSMENT:    ICD-10-CM    1. Hyperopia of both eyes with astigmatism  H52.03     H52.203       2. Presbyopia  H52.4       3. Dry eye  H04.129       4. Bilateral incipient cataracts  H26.9           PLAN:   Artificial tears  as needed   See dry eye syndrome treatment   Update progressive addition lens   Angeliac Morillo OD

## 2024-08-29 NOTE — LETTER
8/29/2024      Yordan Enamorado  [address]  [place] [address]      Dear Colleague,    Thank you [facility] Please see a copy of my visit note below.    Chief Complaint   Patient presents with     Annual Eye Exam        Last Eye Exam: 2-3 years ago   Dilated Previously: Yes, side effects of dilation explained today    What are you currently using to see?  Readers - got a PAL at last exam and never wore it because she said it does not work        Distance Vision Acuity: Satisfied with vision    Near Vision Acuity: Satisfied with vision while reading and using computer with readers    Eye Comfort: dry  Do you use eye drops? : No - bought artificial tears but not using them yet      Kamla Ulloa - Optometric Assistant           Medical, surgical and family histories reviewed and updated 8/29/2024.     History of melanoma     OBJECTIVE: See Ophthalmology exam    ASSESSMENT:    ICD-10-CM    1. Hyperopia of both eyes with astigmatism  H52.03     H52.203       2. Presbyopia  H52.4       3. Dry eye  H04.129       4. Bilateral incipient cataracts  H26.9           PLAN:   Artificial tears  as needed   See dry eye syndrome treatment   Update progressive addition lens   Angelica Morillo OD     Again, thank you for allowing me to participate in the care of your patient.        Sincerely,        Angelica Morillo, OD

## 2024-09-03 ENCOUNTER — VIRTUAL VISIT (OUTPATIENT)
Dept: PSYCHOLOGY | Facility: CLINIC | Age: 69
End: 2024-09-03
Payer: MEDICARE

## 2024-09-03 DIAGNOSIS — F43.23 ADJUSTMENT DISORDER WITH MIXED ANXIETY AND DEPRESSED MOOD: Primary | ICD-10-CM

## 2024-09-03 PROCEDURE — 90834 PSYTX W PT 45 MINUTES: CPT | Mod: 95 | Performed by: SOCIAL WORKER

## 2024-09-03 NOTE — PROGRESS NOTES
[facility]                                     Progress Note    Patient Name: Yordan Enamorado  Date: 9/3/2024     Service Type: Individual      Session Start Time: 9:07 AM Session End Time: 9:48 AM     Session Length: 38-52 minutes    Session #: 6    Attendees: Client attended alone    Service Modality:  Video Visit:      Provider verified identity through the following two step process.  Patient provided:  Patient is known previously to provider    Telemedicine Visit: The patient's condition can be safely assessed and treated via synchronous audio and visual telemedicine encounter.      Reason for Telemedicine Visit: Services only offered telehealth    Originating Site (Patient Location): Patient's home    Distant Site (Provider Location): Provider Remote Setting- Home Office    Consent:  The patient/guardian has verbally consented to: the potential risks and benefits of telemedicine (video visit) versus in person care; bill my insurance or make self-payment for services provided; and responsibility for payment of non-covered services.     Patient would like the video invitation sent by:  Send to e-mail at: [email or web address]    Mode of Communication:  Video Conference [facility]    Distant Location (Provider):  Off-site    As the provider I attest to compliance with applicable laws and regulations related to telemedicine.    DATA  Interactive Complexity: No  Crisis: No        Progress Since Last Session (Related to Symptoms / Goals / Homework):   Symptoms:  no change since previous appointment    Homework:  completed in session      Episode of Care Goals: No improvement - ACTION (Actively working towards change); Intervened by reinforcing change plan / affirming steps taken     Current / Ongoing Stressors and Concerns:   Uncertainties about future; Who are you since jail?   Past trauma   Dynamics in relationship with    Concerns about politics     Treatment Objective(s)  Addressed in This Session:   EMDR: phase 2  Use a minimum of 1 strategy to manage symptoms     Intervention:   EMDR: engaged client in increased awareness of emotions and body sensations, engaged in use of resources     Assessments completed prior to visit:  The following assessments were completed by patient for this visit:  None    ASSESSMENT: Current Emotional / Mental Status (status of significant symptoms):   Risk status (Self / Other harm or suicidal ideation)   Patient denies current fears or concerns for personal safety.   Patient denies current or recent suicidal ideation or behaviors.   Patient denies current or recent homicidal ideation or behaviors.   Patient denies current or recent self injurious behavior or ideation.   Patient denies other safety concerns.   Patient reports there has been no change in risk factors since their last session.     Patient reports there has been no change in protective factors since their last session.     Recommended that patient call 911 or go to the local ED should there be a change in any of these risk factors.     Appearance:   Appropriate    Eye Contact:   Good    Psychomotor Behavior: Normal    Attitude:   Cooperative  Interested   Orientation:   All   Speech    Rate / Production: Normal     Volume:  Normal    Mood:    Anxious    Affect:    Mood Congruent    Thought Content:  Clear    Thought Form:  Coherent    Insight:    Fair and Good     Medication Review:   No current psychiatric medications prescribed     Medication Compliance:   NA     Changes in Health Issues:   None reported     Chemical Use Review:   Substance Use: no use     Tobacco Use: no use    Diagnosis:  Adjustment Disorders  309.24 (F43.22) With anxiety     Collateral Reports Completed:   Not Applicable    PLAN: (Patient Tasks / Therapist Tasks / Other)  EMDR: complete history taking, engage in container and safe/calm state exercises  Client will continue to engage in meditation.  Therapist  previously sent client grounding techniques.  Therapist encouraged continued awareness of emotions and body sensations.  Client declined to schedule appointments in October.    Emilee Marcelo, [facility] 9/3/2024                                    ______________________________________________________________________    Individual Treatment Plan    Patient's Name: Yordan Enamorado  YOB: 1955    Date of Creation: 5/6/2024  Date Treatment Plan Last Reviewed/Revised: 5/6/2024    DSM5 Diagnoses: Adjustment Disorders  309.24 (F43.22) With anxiety  Psychosocial / Contextual Factors: past trauma  PROMIS (reviewed every 90 days):     Assessments completed prior to visit:  The following assessments were completed by patient for this visit:  PROMIS 10-Global Health (only subscores and total score):       11/7/2023    10:01 AM 4/30/2024     9:59 AM 8/31/2024    11:41 AM   PROMIS-10 Scores Only   Global Mental Health Score 14 15 15   Global Physical Health Score 16 18 18   PROMIS TOTAL - SUBSCORES 30 33 33        Referral / Collaboration:  Referral to another professional/service is not indicated at this time..    Anticipated number of session for this episode of care:  will review in 90 days  Anticipation frequency of session: Every other week  Anticipated Duration of each session: 38-52 minutes  Treatment plan will be reviewed in 90 days or when goals have been changed.       MeasurableTreatment Goal(s) related to diagnosis / functional impairment(s)    Goal 1: Patient will be able to recall the traumatic events without becoming overwhelmed with negative thoughts, feelings, or urges.    Objective #A (Patient Action)    Status: New - Date: 5/6/2024    Patient will describe the history of and nature of trauma symptoms    Intervention(s)  Therapist will assess the client's frequency, intensity, duration, and history of trauma symptoms and their impact on functioning.    Objective #B (Patient Action)  Status: New Date:  5/6/2024    Patient will cooperate with eye movement desensitization and reprocessing (EMDR) to reduce emotional reaction to traumatic event(s)    Intervention(s)  Therapist will utilize EMDR to reduce the client's emotional reactivity to the traumatic event(s).    Objective #C (Patient Action)  Status: New Date: 5/6/2024    Patient will learn and implement calming and coping strategies to manage trauma symptoms.    Intervention(s)  Therapist will teach grounding techniques, distress tolerance, and emotion regulation techniques.     Patient has reviewed and agreed to the above plan.      Emilee Marcelo, [facility]  May 6, 2024

## 2024-09-17 ENCOUNTER — VIRTUAL VISIT (OUTPATIENT)
Dept: PSYCHOLOGY | Facility: CLINIC | Age: 69
End: 2024-09-17
Payer: MEDICARE

## 2024-09-17 DIAGNOSIS — F43.23 ADJUSTMENT DISORDER WITH MIXED ANXIETY AND DEPRESSED MOOD: Primary | ICD-10-CM

## 2024-09-17 PROCEDURE — 90837 PSYTX W PT 60 MINUTES: CPT | Mod: 95 | Performed by: SOCIAL WORKER

## 2024-09-17 NOTE — PROGRESS NOTES
[facility]                                     Progress Note    Patient Name: Yordan Enamorado  Date: 9/17/2024     Service Type: Individual      Session Start Time: 9:02 AM Session End Time:  9:59 AM     Session Length: 53+ minutes    Extended Session (53+ minutes): PROLONGED SERVICE IN THE OUTPATIENT SETTING REQUIRING DIRECT (FACE-TO-FACE) PATIENT CONTACT BEYOND THE USUAL SERVICE:  Engaging client in EMDR therapy    Session #: 7    Attendees: Client attended alone    Service Modality:  Video Visit:      Provider verified identity through the following two step process.  Patient provided:  Patient is known previously to provider    Telemedicine Visit: The patient's condition can be safely assessed and treated via synchronous audio and visual telemedicine encounter.      Reason for Telemedicine Visit: Services only offered telehealth    Originating Site (Patient Location): Patient's home    Distant Site (Provider Location): Provider Remote Setting- Home Office    Consent:  The patient/guardian has verbally consented to: the potential risks and benefits of telemedicine (video visit) versus in person care; bill my insurance or make self-payment for services provided; and responsibility for payment of non-covered services.     Patient would like the video invitation sent by:  [facility]    Mode of Communication:  Video Conference via [employer]    Distant Location (Provider):  Off-site    As the provider I attest to compliance with applicable laws and regulations related to telemedicine.    DATA  Interactive Complexity: No  Crisis: No        Progress Since Last Session (Related to Symptoms / Goals / Homework):   Symptoms:  no change since previous appointment    Homework:  achieved      Episode of Care Goals: Minimal progress - ACTION (Actively working towards change); Intervened by reinforcing change plan / affirming steps taken     Current / Ongoing Stressors and Concerns:   Past trauma   Dynamics in  relationship with    Concerns about politics     Treatment Objective(s) Addressed in This Session:   EMDR: phase 1 & 2     Intervention:   EMDR: provided psychoeducation, began history taking, engaged client in container exercise     Assessments completed prior to visit:  The following assessments were completed by patient for this visit:  None    ASSESSMENT: Current Emotional / Mental Status (status of significant symptoms):   Risk status (Self / Other harm or suicidal ideation)   Patient denies current fears or concerns for personal safety.   Patient denies current or recent suicidal ideation or behaviors.   Patient denies current or recent homicidal ideation or behaviors.   Patient denies current or recent self injurious behavior or ideation.   Patient denies other safety concerns.   Patient reports there has been no change in risk factors since their last session.     Patient reports there has been no change in protective factors since their last session.     Recommended that patient call 911 or go to the local ED should there be a change in any of these risk factors.     Appearance:   Appropriate    Eye Contact:   Fair    Psychomotor Behavior: Normal    Attitude:   Cooperative  Interested Friendly   Orientation:   All   Speech    Rate / Production: Normal     Volume:  Normal    Mood:    Anxious    Affect:    Mood Congruent    Thought Content:  Clear    Thought Form:  Coherent  Goal Directed    Insight:    Good      Medication Review:   No current psychiatric medications prescribed     Medication Compliance:   NA     Changes in Health Issues:   None reported     Chemical Use Review:   Substance Use: no use     Tobacco Use: no use    Diagnosis:  Adjustment Disorders  309.24 (F43.22) With anxiety     Collateral Reports Completed:   Not Applicable    PLAN: (Patient Tasks / Therapist Tasks / Other)  EMDR: complete history taking, engage in safe/calm state exercises  Client will continue to engage in  meditation.  Therapist encouraged client to use container, small box  Client will continue to engage in Alanon.    Emilee Marcelo, [facility] 9/17/2024    ______________________________________________________________________    Individual Treatment Plan    Patient's Name: Yordan Enamorado  YOB: 1955    Date of Creation: 5/6/2024  Date Treatment Plan Last Reviewed/Revised: 5/6/2024    DSM5 Diagnoses: Adjustment Disorders  309.24 (F43.22) With anxiety  Psychosocial / Contextual Factors: past trauma  PROMIS (reviewed every 90 days):     Assessments completed prior to visit:  The following assessments were completed by patient for this visit:  PROMIS 10-Global Health (only subscores and total score):       11/7/2023    10:01 AM 4/30/2024     9:59 AM 8/31/2024    11:41 AM 9/12/2024     9:59 AM   PROMIS-10 Scores Only   Global Mental Health Score 14 15 15 16   Global Physical Health Score 16 18 18 18   PROMIS TOTAL - SUBSCORES 30 33 33 34        Referral / Collaboration:  Referral to another professional/service is not indicated at this time..    Anticipated number of session for this episode of care:  will review in 90 days  Anticipation frequency of session: Every other week  Anticipated Duration of each session: 38-52 minutes  Treatment plan will be reviewed in 90 days or when goals have been changed.       MeasurableTreatment Goal(s) related to diagnosis / functional impairment(s)    Goal 1: Patient will be able to recall the traumatic events without becoming overwhelmed with negative thoughts, feelings, or urges.    Objective #A (Patient Action)    Status: New - Date: 5/6/2024    Patient will describe the history of and nature of trauma symptoms    Intervention(s)  Therapist will assess the client's frequency, intensity, duration, and history of trauma symptoms and their impact on functioning.    Objective #B (Patient Action)  Status: New Date: 5/6/2024    Patient will cooperate with eye movement  desensitization and reprocessing (EMDR) to reduce emotional reaction to traumatic event(s)    Intervention(s)  Therapist will utilize EMDR to reduce the client's emotional reactivity to the traumatic event(s).    Objective #C (Patient Action)  Status: New Date: 5/6/2024    Patient will learn and implement calming and coping strategies to manage trauma symptoms.    Intervention(s)  Therapist will teach grounding techniques, distress tolerance, and emotion regulation techniques.     Patient has reviewed and agreed to the above plan.      Emilee Marcelo, [facility]  May 6, 2024

## 2024-10-09 ENCOUNTER — VIRTUAL VISIT (OUTPATIENT)
Dept: PSYCHOLOGY | Facility: CLINIC | Age: 69
End: 2024-10-09
Payer: MEDICARE

## 2024-10-09 DIAGNOSIS — F43.23 ADJUSTMENT DISORDER WITH MIXED ANXIETY AND DEPRESSED MOOD: Primary | ICD-10-CM

## 2024-10-09 PROCEDURE — 90834 PSYTX W PT 45 MINUTES: CPT | Mod: 95 | Performed by: SOCIAL WORKER

## 2024-10-09 NOTE — PROGRESS NOTES
[facility]                                     Progress Note    Patient Name: Yordan Enamorado  Date: 10/9/2024     Service Type: Individual      Session Start Time: 9:01 AM Session End Time: 9:49 AM     Session Length: 38-52 minutes    Session #: 8    Attendees: Client attended alone    Service Modality:  Video Visit:      Provider verified identity through the following two step process.  Patient provided:  Patient is known previously to provider    Telemedicine Visit: The patient's condition can be safely assessed and treated via synchronous audio and visual telemedicine encounter.      Reason for Telemedicine Visit: Services only offered telehealth    Originating Site (Patient Location): Patient's home    Distant Site (Provider Location): [facility]: [place], [place]    Consent:  The patient/guardian has verbally consented to: the potential risks and benefits of telemedicine (video visit) versus in person care; bill my insurance or make self-payment for services provided; and responsibility for payment of non-covered services.     Patient would like the video invitation sent by:  Next audience    Mode of Communication:  Video Conference via [facility]    Distant Location (Provider):  Off-site    As the provider I attest to compliance with applicable laws and regulations related to telemedicine.    DATA  Interactive Complexity: No  Crisis: No        Progress Since Last Session (Related to Symptoms / Goals / Homework):   Symptoms:  client reported increase in obsessive thoughts following previous appointment    Homework:  achieved      Episode of Care Goals: Minimal progress - ACTION (Actively working towards change); Intervened by reinforcing change plan / affirming steps taken     Current / Ongoing Stressors and Concerns:   Past trauma   Dynamics in relationship with      Treatment Objective(s) Addressed in This Session:   EMDR: phase 2     Intervention:   EMDR: engaged client in  increased awareness of body sensations and emotions , discussed strategies to manage symptoms, engaged in containment   Engaged in active listening     Assessments completed prior to visit:  The following assessments were completed by patient for this visit:  None    ASSESSMENT: Current Emotional / Mental Status (status of significant symptoms):   Risk status (Self / Other harm or suicidal ideation)   Patient denies current fears or concerns for personal safety.   Patient denies current or recent suicidal ideation or behaviors.   Patient denies current or recent homicidal ideation or behaviors.   Patient denies current or recent self injurious behavior or ideation.   Patient denies other safety concerns.   Patient reports there has been no change in risk factors since their last session.     Patient reports there has been no change in protective factors since their last session.     Recommended that patient call 911 or go to the local ED should there be a change in any of these risk factors.     Appearance:   Appropriate    Eye Contact:   Fair    Psychomotor Behavior: Normal    Attitude:   Cooperative  Interested Friendly Pleasant   Orientation:   All   Speech    Rate / Production: Normal     Volume:  Normal    Mood:    Anxious    Affect:    Mood Congruent    Thought Content:  Clear    Thought Form:  Coherent  Goal Directed  Logical    Insight:    Good      Medication Review:   No current psychiatric medications prescribed     Medication Compliance:   NA     Changes in Health Issues:   None reported     Chemical Use Review:   Substance Use: no use     Tobacco Use: no use    Diagnosis:  Adjustment Disorders  309.24 (F43.22) With anxiety     Collateral Reports Completed:   Not Applicable    PLAN: (Patient Tasks / Therapist Tasks / Other)  EMDR: complete history taking, engage in safe/calm state exercises.  Therapist will continue to engage client in increased awareness of body sensations and emotions.  Client will  continue to engage in meditation.  Therapist encouraged client to use container, small box  Client will continue to engage in Alanon.    Emilee Marcelo, [facility] 10/9/2024    ______________________________________________________________________    Individual Treatment Plan    Patient's Name: Yordan Enamorado  YOB: 1955    Date of Creation: 5/6/2024  Date Treatment Plan Last Reviewed/Revised: 10/9/2024    DSM5 Diagnoses: Adjustment Disorders  309.24 (F43.22) With anxiety  Psychosocial / Contextual Factors: past trauma  PROMIS (reviewed every 90 days):     Assessments completed prior to visit:  The following assessments were completed by patient for this visit:  PROMIS 10-Global Health (only subscores and total score):       11/7/2023    10:01 AM 4/30/2024     9:59 AM 8/31/2024    11:41 AM 9/12/2024     9:59 AM   PROMIS-10 Scores Only   Global Mental Health Score 14 15 15 16   Global Physical Health Score 16 18 18 18   PROMIS TOTAL - SUBSCORES 30 33 33 34        Referral / Collaboration:  Referral to another professional/service is not indicated at this time..    Anticipated number of session for this episode of care:  will review in 90 days  Anticipation frequency of session: Every other week  Anticipated Duration of each session: 38-52 minutes  Treatment plan will be reviewed in 90 days or when goals have been changed.       MeasurableTreatment Goal(s) related to diagnosis / functional impairment(s)    Goal 1: Patient will be able to recall the traumatic events without becoming overwhelmed with negative thoughts, feelings, or urges.    Objective #A (Patient Action)    Status: New - Date: 5/6/2024, continued date: 10/9/2024    Patient will describe the history of and nature of trauma symptoms    Intervention(s)  Therapist will assess the client's frequency, intensity, duration, and history of trauma symptoms and their impact on functioning.    Objective #B (Patient Action)  Status: New Date: 5/6/2024,  continued date: 10/9/2024    Patient will cooperate with eye movement desensitization and reprocessing (EMDR) to reduce emotional reaction to traumatic event(s)    Intervention(s)  Therapist will utilize EMDR to reduce the client's emotional reactivity to the traumatic event(s).    Objective #C (Patient Action)  Status: New Date: 5/6/2024, continued date: 10/9/2024    Patient will learn and implement calming and coping strategies to manage trauma symptoms.    Intervention(s)  Therapist will teach grounding techniques, distress tolerance, and emotion regulation techniques.     Patient has reviewed and agreed to the above plan.      Emilee Marcelo, [facility]  May 6, 2024   KEZIA Barrett  10/9/2024

## 2024-10-14 ENCOUNTER — OFFICE VISIT (OUTPATIENT)
Dept: DERMATOLOGY | Facility: CLINIC | Age: 69
End: 2024-10-14
Payer: MEDICARE

## 2024-10-14 DIAGNOSIS — Z86.006 HISTORY OF MELANOMA IN SITU: ICD-10-CM

## 2024-10-14 DIAGNOSIS — L50.3 DERMATOGRAPHISM: ICD-10-CM

## 2024-10-14 DIAGNOSIS — F95.8 HABIT-TIC NAIL DEFORMITY: Primary | ICD-10-CM

## 2024-10-14 DIAGNOSIS — L60.8 HABIT-TIC NAIL DEFORMITY: Primary | ICD-10-CM

## 2024-10-14 PROCEDURE — 99214 OFFICE O/P EST MOD 30 MIN: CPT | Performed by: STUDENT IN AN ORGANIZED HEALTH CARE EDUCATION/TRAINING PROGRAM

## 2024-10-14 NOTE — PROGRESS NOTES
[facility]atology Note    Encounter Date: Oct 14, 2024    Dermatology Problem List:  #Hx LIZZETTE  - VALENTINA buttock 2019    Major PMHx  -   ______________________________________    Impression/Plan:  Yordan was seen today for derm problem.    Diagnoses and all orders for this visit:    Habit-tic nail deformity  - benign  - declines topical steroid    Dermatographism  - daily antihistamines     History of melanoma in situ  - No obvious evidence of recurrence at site of previous malignancy  - Reviewed the compounding benefits of incremental changes to sun protective clothing behaviors including increased frequency of sunscreen and sun protective clothing like broad brimmed hats and longsleeved UPF containing clothing      Follow-up in .       Staff Involved:  Staff Only    Daniel Barrett MD   of Dermatology  Department of Dermatology  [facility]      CC:   Chief Complaint   Patient presents with    Derm Problem     Skin check   Very dry skin itching a lot, all over the body        History of Present Illness:  Ms. Yordan Enamorado is a 68 year old female who presents as a new patient.    Hx MIS. Pt presents today for concerns about spots on trunk and extremities. Having terrible itching occurring intermittently. Is moisturizing frequently         Labs:      Physical exam:  Vitals: There were no vitals taken for this visit.  GEN: well developed, well-nourished, in no acute distress, in a pleasant mood.     SKIN: Carroll phototype 1  - Full skin, which includes the head/face, both arms, chest, back, abdomen,both legs, genitalia and/or groin buttocks, digits and/or nails, was examined.  - Flat brown macules and patches in a sun exposed areas on face and extremities  - Stuck on brown papules on trunk and extremities   - No other lesions of concern on areas examined.     Past Medical History:   Past Medical History:   Diagnosis Date    Arthritis      Diabetes (H)     Melanoma of skin (H)     left buttocks     Past Surgical History:   Procedure Laterality Date    BIOPSY      BREAST EXCISIONAL BIOPSY      all benign    BREAST SURGERY      COLONOSCOPY         Social History:   reports that she has quit smoking. Her smoking use included cigarettes. She has a 10 pack-year smoking history. She has been exposed to tobacco smoke. She has never used smokeless tobacco. She reports that she does not currently use alcohol. She reports that she does not use drugs.    Family History:  Family History   Problem Relation Age of Onset    Cancer Mother         Lymph nodes    Other Cancer Mother     Other - See Comments Father         tobacco related death    Diabetes Paternal Grandfather     Other - See Comments Brother          in [place] war    Pulmonary Embolism Sister     Myocardial Infarction Sister         tobacco related    No Known Problems Son     No Known Problems Daughter     No Known Problems Daughter     Breast Cancer Maternal Aunt     Melanoma No family hx of     Glaucoma No family hx of     Macular Degeneration No family hx of        Medications:  Current Outpatient Medications   Medication Sig Dispense Refill    vitamin B complex with vitamin C (STRESS TAB) tablet Take 1 tablet by mouth daily      vitamin D3 (CHOLECALCIFEROL) 50 mcg (2000 units) tablet Take 1 tablet by mouth daily      VITAMIN E PO Take by mouth daily      Zinc Sulfate (ZINC 15 PO)        Allergies   Allergen Reactions    Demerol [Meperidine] Unknown     Pet patient- she was told by nurse that she might have an allergy to demerol. This was noted after a colonoscopy she had done.

## 2024-10-14 NOTE — LETTER
10/14/2024      Yordan Enamorado  [address]  [place] [address]      Dear Colleague,    Thank you for referring your patient, Yordan Enamorado, to the [facility]. Please see a copy of my visit note below.    [facility] Dermatology Note    Encounter Date: Oct 14, 2024    Dermatology Problem List:  #Hx MIS  - L buttock 2019    Major PMHx  -   ______________________________________    Impression/Plan:  Yordan was seen today for derm problem.    Diagnoses and all orders for this visit:    Habit-tic nail deformity  - benign  - declines topical steroid    Dermatographism  - daily antihistamines     History of melanoma in situ  - No obvious evidence of recurrence at site of previous malignancy  - Reviewed the compounding benefits of incremental changes to sun protective clothing behaviors including increased frequency of sunscreen and sun protective clothing like broad brimmed hats and longsleeved UPF containing clothing      Follow-up in .       Staff Involved:  Staff Only    Daniel Barrett MD   of Dermatology  Department of Dermatology  [facility]      CC:   Chief Complaint   Patient presents with     Derm Problem     Skin check   Very dry skin itching a lot, all over the body        History of Present Illness:  Ms. Yordan Enamorado is a 68 year old female who presents as a new patient.    Hx MIS. Pt presents today for concerns about spots on trunk and extremities. Having terrible itching occurring intermittently. Is moisturizing frequently         Labs:      Physical exam:  Vitals: There were no vitals taken for this visit.  GEN: well developed, well-nourished, in no acute distress, in a pleasant mood.     SKIN: Carroll phototype 1  - Full skin, which includes the head/face, both arms, chest, back, abdomen,both legs, genitalia and/or groin buttocks, digits and/or nails, was examined.  - Flat brown macules  and patches in a sun exposed areas on face and extremities  - Stuck on brown papules on trunk and extremities   - No other lesions of concern on areas examined.     Past Medical History:   Past Medical History:   Diagnosis Date     Arthritis      Diabetes (H)      Melanoma of skin (H)     left buttocks     Past Surgical History:   Procedure Laterality Date     BIOPSY       BREAST EXCISIONAL BIOPSY      all benign     BREAST SURGERY       COLONOSCOPY         Social History:   reports that she has quit smoking. Her smoking use included cigarettes. She has a 10 pack-year smoking history. She has been exposed to tobacco smoke. She has never used smokeless tobacco. She reports that she does not currently use alcohol. She reports that she does not use drugs.    Family History:  Family History   Problem Relation Age of Onset     Cancer Mother         Lymph nodes     Other Cancer Mother      Other - See Comments Father         tobacco related death     Diabetes Paternal Grandfather      Other - See Comments Brother          in [place] war     Pulmonary Embolism Sister      Myocardial Infarction Sister         tobacco related     No Known Problems Son      No Known Problems Daughter      No Known Problems Daughter      Breast Cancer Maternal Aunt      Melanoma No family hx of      Glaucoma No family hx of      Macular Degeneration No family hx of        Medications:  Current Outpatient Medications   Medication Sig Dispense Refill     vitamin B complex with vitamin C (STRESS TAB) tablet Take 1 tablet by mouth daily       vitamin D3 (CHOLECALCIFEROL) 50 mcg (2000 units) tablet Take 1 tablet by mouth daily       VITAMIN E PO Take by mouth daily       Zinc Sulfate (ZINC 15 PO)        Allergies   Allergen Reactions     Demerol [Meperidine] Unknown     Pet patient- she was told by nurse that she might have an allergy to demerol. This was noted after a colonoscopy she had done.               Again, thank you for allowing me to  participate in the care of your patient.        Sincerely,        Daniel Barrett MD

## 2024-10-23 ENCOUNTER — VIRTUAL VISIT (OUTPATIENT)
Dept: PSYCHOLOGY | Facility: CLINIC | Age: 69
End: 2024-10-23
Payer: MEDICARE

## 2024-10-23 DIAGNOSIS — F43.23 ADJUSTMENT DISORDER WITH MIXED ANXIETY AND DEPRESSED MOOD: Primary | ICD-10-CM

## 2024-10-23 PROCEDURE — 90834 PSYTX W PT 45 MINUTES: CPT | Mod: 95 | Performed by: SOCIAL WORKER

## 2024-10-23 NOTE — PROGRESS NOTES
[facility]                                     Progress Note    Patient Name: Yordan Enamorado  Date: 10/23/2024     Service Type: Individual      Session Start Time: 10:01 AM Session End Time: 10:46 AM     Session Length: 38-52 minutes    Session #: 9    Attendees: Client attended alone    Service Modality:  Video Visit:      Provider verified identity through the following two step process.  Patient provided:  Patient is known previously to provider    Telemedicine Visit: The patient's condition can be safely assessed and treated via synchronous audio and visual telemedicine encounter.      Reason for Telemedicine Visit: Services only offered telehealth    Originating Site (Patient Location): Patient's home    Distant Site (Provider Location): [facility]: [place], [place]    Consent:  The patient/guardian has verbally consented to: the potential risks and benefits of telemedicine (video visit) versus in person care; bill my insurance or make self-payment for services provided; and responsibility for payment of non-covered services.     Patient would like the video invitation sent by:  [employer]    Mode of Communication:  Video Conference via [facility]    Distant Location (Provider):  Off-site    As the provider I attest to compliance with applicable laws and regulations related to telemedicine.    DATA  Interactive Complexity: No  Crisis: No        Progress Since Last Session (Related to Symptoms / Goals / Homework):   Symptoms:  Increase in anxiety due to upcoming election    Homework:  achieved      Episode of Care Goals: Minimal progress - ACTION (Actively working towards change); Intervened by reinforcing change plan / affirming steps taken     Current / Ongoing Stressors and Concerns:   Past trauma   Dynamics in relationship with    Upcoming election     Treatment Objective(s) Addressed in This Session:   EMDR: phase 2  Identify 1 fear/thought that contributes to anxiety  symptoms     Intervention:   EMDR: engaged client in increased awareness of body sensations and emotions , identified potential targets, engaged in use of resources   Engaged in active listening   CBT: identified cognitive restructuring techniques     Assessments completed prior to visit:  The following assessments were completed by patient for this visit:  None    ASSESSMENT: Current Emotional / Mental Status (status of significant symptoms):   Risk status (Self / Other harm or suicidal ideation)   Patient denies current fears or concerns for personal safety.   Patient denies current or recent suicidal ideation or behaviors.   Patient denies current or recent homicidal ideation or behaviors.   Patient denies current or recent self injurious behavior or ideation.   Patient denies other safety concerns.   Patient reports there has been no change in risk factors since their last session.     Patient reports there has been no change in protective factors since their last session.     Recommended that patient call 911 or go to the local ED should there be a change in any of these risk factors     Appearance:   Appropriate    Eye Contact:   Good    Psychomotor Behavior: Normal    Attitude:   Cooperative  Interested Friendly Pleasant   Orientation:   All   Speech    Rate / Production: Normal     Volume:  Normal    Mood:    Anxious    Affect:    Mood Congruent    Thought Content:  Clear    Thought Form:  Coherent  Goal Directed  Logical    Insight:    Good      Medication Review:   No current psychiatric medications prescribed     Medication Compliance:   NA     Changes in Health Issues:   None reported     Chemical Use Review:   Substance Use: no use     Tobacco Use: no use    Diagnosis:  Adjustment Disorders  309.24 (F43.22) With anxiety     Collateral Reports Completed:   Not Applicable    PLAN: (Patient Tasks / Therapist Tasks / Other)  EMDR: complete history taking, engage in safe/calm state exercises.  Therapist will  continue to engage client in increased awareness of body sensations and emotions.  Client will continue to engage in meditation, prayer, exercise  Therapist encouraged client to use container, small box  Client will continue to engage in Alanon.  Client will continue to identify areas within her control.    Emilee Marcelo, [facility] 10/23/2024    ______________________________________________________________________    Individual Treatment Plan    Patient's Name: Yordan Enamorado  YOB: 1955    Date of Creation: 5/6/2024  Date Treatment Plan Last Reviewed/Revised: 10/9/2024    DSM5 Diagnoses: Adjustment Disorders  309.24 (F43.22) With anxiety  Psychosocial / Contextual Factors: past trauma  PROMIS (reviewed every 90 days):     Assessments completed prior to visit:  The following assessments were completed by patient for this visit:  PROMIS 10-Global Health (only subscores and total score):       11/7/2023    10:01 AM 4/30/2024     9:59 AM 8/31/2024    11:41 AM 9/12/2024     9:59 AM   PROMIS-10 Scores Only   Global Mental Health Score 14 15 15 16   Global Physical Health Score 16 18 18 18   PROMIS TOTAL - SUBSCORES 30 33 33 34        Referral / Collaboration:  Referral to another professional/service is not indicated at this time..    Anticipated number of session for this episode of care:  will review in 90 days  Anticipation frequency of session: Every other week  Anticipated Duration of each session: 38-52 minutes  Treatment plan will be reviewed in 90 days or when goals have been changed.       MeasurableTreatment Goal(s) related to diagnosis / functional impairment(s)    Goal 1: Patient will be able to recall the traumatic events without becoming overwhelmed with negative thoughts, feelings, or urges.    Objective #A (Patient Action)    Status: New - Date: 5/6/2024, continued date: 10/9/2024    Patient will describe the history of and nature of trauma symptoms    Intervention(s)  Therapist will assess  the client's frequency, intensity, duration, and history of trauma symptoms and their impact on functioning.    Objective #B (Patient Action)  Status: New Date: 5/6/2024, continued date: 10/9/2024    Patient will cooperate with eye movement desensitization and reprocessing (EMDR) to reduce emotional reaction to traumatic event(s)    Intervention(s)  Therapist will utilize EMDR to reduce the client's emotional reactivity to the traumatic event(s).    Objective #C (Patient Action)  Status: New Date: 5/6/2024, continued date: 10/9/2024    Patient will learn and implement calming and coping strategies to manage trauma symptoms.    Intervention(s)  Therapist will teach grounding techniques, distress tolerance, and emotion regulation techniques.     Patient has reviewed and agreed to the above plan.      Emilee Marcelo, [facility]  May 6, 2024   KEZIA Barrett  10/9/2024

## 2024-10-31 DIAGNOSIS — Z12.11 COLON CANCER SCREENING: ICD-10-CM

## 2024-11-07 ENCOUNTER — VIRTUAL VISIT (OUTPATIENT)
Dept: PSYCHOLOGY | Facility: CLINIC | Age: 69
End: 2024-11-07
Payer: MEDICARE

## 2024-11-07 DIAGNOSIS — F43.23 ADJUSTMENT DISORDER WITH MIXED ANXIETY AND DEPRESSED MOOD: Primary | ICD-10-CM

## 2024-11-07 PROCEDURE — 90834 PSYTX W PT 45 MINUTES: CPT | Mod: 95 | Performed by: SOCIAL WORKER

## 2024-11-07 NOTE — PROGRESS NOTES
[facility]                                     Progress Note    Patient Name: Yordan Enamorado  Date: 11/7/2024     Service Type: Individual      Session Start Time: 9:35 AM Session End Time: 10:19 AM     Session Length: 38-52 minutes    Session #: 10    Attendees: Client attended alone    Service Modality:  Video Visit:      Provider verified identity through the following two step process.  Patient provided:  Patient is known previously to provider    Telemedicine Visit: The patient's condition can be safely assessed and treated via synchronous audio and visual telemedicine encounter.      Reason for Telemedicine Visit: Services only offered telehealth    Originating Site (Patient Location): Patient's home    Distant Site (Provider Location): Provider Remote Setting- Home Office    Consent:  The patient/guardian has verbally consented to: the potential risks and benefits of telemedicine (video visit) versus in person care; bill my insurance or make self-payment for services provided; and responsibility for payment of non-covered services.     Patient would like the video invitation sent by:  [employer]    Mode of Communication:  Video Conference via [employer]    Distant Location (Provider):  Off-site    As the provider I attest to compliance with applicable laws and regulations related to telemedicine.    DATA  Interactive Complexity: No  Crisis: No        Progress Since Last Session (Related to Symptoms / Goals / Homework):   Symptoms:  no change since previous appointment    Homework:  achieved      Episode of Care Goals: Satisfactory progress - ACTION (Actively working towards change); Intervened by reinforcing change plan / affirming steps taken     Current / Ongoing Stressors and Concerns:   Past trauma   Dynamics in relationship with    election     Treatment Objective(s) Addressed in This Session:   EMDR: phase 2  Identify 1 fear/thought that contributes to anxiety  symptoms     Intervention:   EMDR: engaged client in increased awareness of emotions     Engaged in active listening   CBT: identified all or nothing thinking, modeled cognitive restructuring     Assessments completed prior to visit:  The following assessments were completed by patient for this visit:  None    ASSESSMENT: Current Emotional / Mental Status (status of significant symptoms):   Risk status (Self / Other harm or suicidal ideation)   Patient denies current fears or concerns for personal safety.   Patient denies current or recent suicidal ideation or behaviors.   Patient denies current or recent homicidal ideation or behaviors.   Patient denies current or recent self injurious behavior or ideation.   Patient denies other safety concerns.   Patient reports there has been no change in risk factors since their last session.     Patient reports there has been no change in protective factors since their last session.     Recommended that patient call 911 or go to the local ED should there be a change in any of these risk factors     Appearance:   Appropriate    Eye Contact:   Good and Fair   Psychomotor Behavior: Normal    Attitude:   Cooperative  Interested Friendly Pleasant   Orientation:   All   Speech    Rate / Production: Normal/ Responsive Normal     Volume:  Normal    Mood:    Anxious    Affect:    Mood Congruent    Thought Content:  Clear    Thought Form:  Coherent  Goal Directed  Logical    Insight:    Good      Medication Review:   No current psychiatric medications prescribed     Medication Compliance:   NA     Changes in Health Issues:   None reported     Chemical Use Review:   Substance Use: no use     Tobacco Use: no use    Diagnosis:  Adjustment Disorders  309.24 (F43.22) With anxiety     Collateral Reports Completed:   Not Applicable    PLAN: (Patient Tasks / Therapist Tasks / Other)  EMDR: complete history taking, engage in safe/calm state exercises.  Therapist will continue to engage client in  "increased awareness of body sensations and emotions.  Client will continue to engage in meditation, prayer, exercise  Container: small box  Client will continue to engage in Alanon.  Client shared intention to \"be aware, be sensitive, be active in positive things happening.  Next appointment on December 5th; client declined to schedule any additional appointments.     Emileeherlinda Marcelo, [facility] 11/7/2024    ______________________________________________________________________    Individual Treatment Plan    Patient's Name: Yordan Enamorado  YOB: 1955    Date of Creation: 5/6/2024  Date Treatment Plan Last Reviewed/Revised: 10/9/2024    DSM5 Diagnoses: Adjustment Disorders  309.24 (F43.22) With anxiety  Psychosocial / Contextual Factors: past trauma  PROMIS (reviewed every 90 days):     Assessments completed prior to visit:  The following assessments were completed by patient for this visit:  PROMIS 10-Global Health (only subscores and total score):       11/7/2023    10:01 AM 4/30/2024     9:59 AM 8/31/2024    11:41 AM 9/12/2024     9:59 AM   PROMIS-10 Scores Only   Global Mental Health Score 14 15 15 16   Global Physical Health Score 16 18 18 18   PROMIS TOTAL - SUBSCORES 30 33 33 34        Referral / Collaboration:  Referral to another professional/service is not indicated at this time..    Anticipated number of session for this episode of care:  will review in 90 days  Anticipation frequency of session: Every other week  Anticipated Duration of each session: 38-52 minutes  Treatment plan will be reviewed in 90 days or when goals have been changed.       MeasurableTreatment Goal(s) related to diagnosis / functional impairment(s)    Goal 1: Patient will be able to recall the traumatic events without becoming overwhelmed with negative thoughts, feelings, or urges.    Objective #A (Patient Action)    Status: New - Date: 5/6/2024, continued date: 10/9/2024    Patient will describe the history of and nature " of trauma symptoms    Intervention(s)  Therapist will assess the client's frequency, intensity, duration, and history of trauma symptoms and their impact on functioning.    Objective #B (Patient Action)  Status: New Date: 5/6/2024, continued date: 10/9/2024    Patient will cooperate with eye movement desensitization and reprocessing (EMDR) to reduce emotional reaction to traumatic event(s)    Intervention(s)  Therapist will utilize EMDR to reduce the client's emotional reactivity to the traumatic event(s).    Objective #C (Patient Action)  Status: New Date: 5/6/2024, continued date: 10/9/2024    Patient will learn and implement calming and coping strategies to manage trauma symptoms.    Intervention(s)  Therapist will teach grounding techniques, distress tolerance, and emotion regulation techniques.     Patient has reviewed and agreed to the above plan.      Emilee Marcelo, [facility]  May 6, 2024   Emilee Marcelo, [facility]  10/9/2024

## 2024-11-14 ENCOUNTER — ORDERS ONLY (AUTO-RELEASED) (OUTPATIENT)
Dept: FAMILY MEDICINE | Facility: CLINIC | Age: 69
End: 2024-11-14
Payer: MEDICARE

## 2024-11-14 DIAGNOSIS — Z12.11 COLON CANCER SCREENING: ICD-10-CM

## 2024-11-28 LAB — NONINV COLON CA DNA+OCC BLD SCRN STL QL: NEGATIVE

## 2024-12-11 ENCOUNTER — VIRTUAL VISIT (OUTPATIENT)
Dept: PSYCHOLOGY | Facility: CLINIC | Age: 69
End: 2024-12-11
Payer: MEDICARE

## 2024-12-11 DIAGNOSIS — F43.23 ADJUSTMENT DISORDER WITH MIXED ANXIETY AND DEPRESSED MOOD: Primary | ICD-10-CM

## 2024-12-11 PROCEDURE — 90834 PSYTX W PT 45 MINUTES: CPT | Mod: 95 | Performed by: SOCIAL WORKER

## 2024-12-11 NOTE — PROGRESS NOTES
[facility]                                     Progress Note    Patient Name: Yordan Enamorado  Date: 12/11/2024     Service Type: Individual      Session Start Time: 10:02 AM Session End Time: 10:46 AM     Session Length: 38-52 minutes    Session #: 11    Attendees: Client attended alone    Service Modality:  Video Visit:      Provider verified identity through the following two step process.  Patient provided:  Patient is known previously to provider    Telemedicine Visit: The patient's condition can be safely assessed and treated via synchronous audio and visual telemedicine encounter.      Reason for Telemedicine Visit: Services only offered telehealth    Originating Site (Patient Location): Patient's home    Distant Site (Provider Location): [facility]: [place]    Consent:  The patient/guardian has verbally consented to: the potential risks and benefits of telemedicine (video visit) versus in person care; bill my insurance or make self-payment for services provided; and responsibility for payment of non-covered services.     Patient would like the video invitation sent by:  [employer]    Mode of Communication:  Video Conference via [facility]    Distant Location (Provider):  On-site    As the provider I attest to compliance with applicable laws and regulations related to telemedicine.    DATA  Interactive Complexity: No  Crisis: No        Progress Since Last Session (Related to Symptoms / Goals / Homework):   Symptoms:  no change since previous appointment    Homework:  achieved      Episode of Care Goals: Satisfactory progress - ACTION (Actively working towards change); Intervened by reinforcing change plan / affirming steps taken     Current / Ongoing Stressors and Concerns:   Past trauma   Dynamics in relationship with    Stage of life     Treatment Objective(s) Addressed in This Session:   EMDR: phase 2     Intervention:   EMDR: provided psychoeducation, engaged in  increased awareness of emotions and body sensations, discussed next steps     Engaged in active listening     Assessments completed prior to visit:  The following assessments were completed by patient for this visit:  None    ASSESSMENT: Current Emotional / Mental Status (status of significant symptoms):   Risk status (Self / Other harm or suicidal ideation)   Patient denies current fears or concerns for personal safety.   Patient denies current or recent suicidal ideation or behaviors.   Patient denies current or recent homicidal ideation or behaviors.   Patient denies current or recent self injurious behavior or ideation.   Patient denies other safety concerns.   Patient reports there has been no change in risk factors since their last session.     Patient reports there has been no change in protective factors since their last session.     Recommended that patient call 911 or go to the local ED should there be a change in any of these risk factors     Appearance:   Appropriate    Eye Contact:   Good and Fair   Psychomotor Behavior: Normal restless hands   Attitude:   Cooperative  Interested Friendly Pleasant   Orientation:   All   Speech    Rate / Production: Normal/ Responsive Normal     Volume:  Normal    Mood:    Anxious  Sad    Affect:    Mood Congruent    Thought Content:  Clear    Thought Form:  Coherent  Goal Directed  Logical    Insight:    Good      Medication Review:   No current psychiatric medications prescribed     Medication Compliance:   NA     Changes in Health Issues:   None reported     Chemical Use Review:   Substance Use: no use     Tobacco Use: no use    Diagnosis:  Adjustment Disorders  309.24 (F43.22) With anxiety     Collateral Reports Completed:   Not Applicable    PLAN: (Patient Tasks / Therapist Tasks / Other)  EMDR: complete history taking, engage in safe/calm state exercises.  Therapist will continue to engage client in increased awareness of body sensations and emotions.  Client will  continue to engage in meditation, prayer, exercise  Container: small box  Client will continue to engage in Alanon.  Client will continue to increase awareness of emotions and body sensations  [place]    Emilee Marcelo, [facility] 12/11/2024    ______________________________________________________________________    Individual Treatment Plan    Patient's Name: Yordan Enamorado  YOB: 1955    Date of Creation: 5/6/2024  Date Treatment Plan Last Reviewed/Revised: 10/9/2024    DSM5 Diagnoses: Adjustment Disorders  309.24 (F43.22) With anxiety  Psychosocial / Contextual Factors: past trauma  PROMIS (reviewed every 90 days):     Assessments completed prior to visit:  The following assessments were completed by patient for this visit:  PROMIS 10-Global Health (only subscores and total score):       11/7/2023    10:01 AM 4/30/2024     9:59 AM 8/31/2024    11:41 AM 9/12/2024     9:59 AM   PROMIS-10 Scores Only   Global Mental Health Score 14 15 15 16   Global Physical Health Score 16 18 18 18   PROMIS TOTAL - SUBSCORES 30 33 33 34        Referral / Collaboration:  Referral to another professional/service is not indicated at this time..    Anticipated number of session for this episode of care:  will review in 90 days  Anticipation frequency of session: Every other week  Anticipated Duration of each session: 38-52 minutes  Treatment plan will be reviewed in 90 days or when goals have been changed.       MeasurableTreatment Goal(s) related to diagnosis / functional impairment(s)    Goal 1: Patient will be able to recall the traumatic events without becoming overwhelmed with negative thoughts, feelings, or urges.    Objective #A (Patient Action)    Status: New - Date: 5/6/2024, continued date: 10/9/2024    Patient will describe the history of and nature of trauma symptoms    Intervention(s)  Therapist will assess the client's frequency, intensity,  duration, and history of trauma symptoms and their impact on functioning.    Objective #B (Patient Action)  Status: New Date: 5/6/2024, continued date: 10/9/2024    Patient will cooperate with eye movement desensitization and reprocessing (EMDR) to reduce emotional reaction to traumatic event(s)    Intervention(s)  Therapist will utilize EMDR to reduce the client's emotional reactivity to the traumatic event(s).    Objective #C (Patient Action)  Status: New Date: 5/6/2024, continued date: 10/9/2024    Patient will learn and implement calming and coping strategies to manage trauma symptoms.    Intervention(s)  Therapist will teach grounding techniques, distress tolerance, and emotion regulation techniques.     Patient has reviewed and agreed to the above plan.      Emilee Marcelo, [facility]  May 6, 2024   KEZIA Barrett  10/9/2024

## 2025-03-04 ENCOUNTER — VIRTUAL VISIT (OUTPATIENT)
Facility: CLINIC | Age: 70
End: 2025-03-04
Payer: MEDICARE

## 2025-03-04 DIAGNOSIS — F43.23 ADJUSTMENT DISORDER WITH MIXED ANXIETY AND DEPRESSED MOOD: Primary | ICD-10-CM

## 2025-03-04 PROCEDURE — 90834 PSYTX W PT 45 MINUTES: CPT | Mod: 95 | Performed by: SOCIAL WORKER

## 2025-04-01 ENCOUNTER — ANCILLARY ORDERS (OUTPATIENT)
Dept: MAMMOGRAPHY | Facility: CLINIC | Age: 70
End: 2025-04-01
Payer: MEDICARE

## 2025-04-01 DIAGNOSIS — Z12.31 VISIT FOR SCREENING MAMMOGRAM: Primary | ICD-10-CM

## 2025-05-20 ENCOUNTER — TRANSFERRED RECORDS (OUTPATIENT)
Dept: HEALTH INFORMATION MANAGEMENT | Facility: CLINIC | Age: 70
End: 2025-05-20

## 2025-06-03 ASSESSMENT — PATIENT HEALTH QUESTIONNAIRE - PHQ9
SUM OF ALL RESPONSES TO PHQ QUESTIONS 1-9: 0
SUM OF ALL RESPONSES TO PHQ QUESTIONS 1-9: 0
10. IF YOU CHECKED OFF ANY PROBLEMS, HOW DIFFICULT HAVE THESE PROBLEMS MADE IT FOR YOU TO DO YOUR WORK, TAKE CARE OF THINGS AT HOME, OR GET ALONG WITH OTHER PEOPLE: NOT DIFFICULT AT ALL

## 2025-06-04 ENCOUNTER — OFFICE VISIT (OUTPATIENT)
Dept: BEHAVIORAL HEALTH | Facility: CLINIC | Age: 70
End: 2025-06-04
Payer: MEDICARE

## 2025-06-04 DIAGNOSIS — F43.22 ADJUSTMENT DISORDER WITH ANXIOUS MOOD: Primary | ICD-10-CM

## 2025-06-04 PROCEDURE — 90832 PSYTX W PT 30 MINUTES: CPT

## 2025-06-04 NOTE — PROGRESS NOTES
[facility] Primary Care: Integrated Behavioral Health    Integrated Behavioral Health   Mental Health & Addiction Services      Progress Note - Initial [facility] Visit     Patient Name: Yordan Enamorado    Date: June 4, 2025  Service Type: Individual   Visit Start Time: 10:19 AM  Visit End Time:  10:40 am   Attendees: Patient   Service Modality: In-person     [facility] Visit Activities (Refresh list every visit): NEW and [facility] Only         DATA:     Interactive Complexity: No   Crisis: No     Assessments completed:     The following assessments were completed by patient for this visit:  PHQ9:       11/14/2023     8:00 AM 6/3/2025     2:42 PM   PHQ-9 SCORE   PHQ-9 Total Score MyChart  0   PHQ-9 Total Score 3 0        Patient-reported     GAD7:       11/14/2023     8:00 AM   NASEEM-7 SCORE   Total Score 5     CAGE-AID:       11/7/2023    10:01 AM   CAGE-AID Total Score   Total Score 0   Total Score MyChart 0 (A total score of 2 or greater is considered clinically significant)     PROMIS 10-Global Health (all questions and answers displayed):       11/7/2023    10:01 AM 4/30/2024     9:59 AM 8/31/2024    11:41 AM 9/12/2024     9:59 AM 3/3/2025     4:29 PM 6/3/2025     2:44 PM   PROMIS 10   In general, would you say your health is: Very good Excellent Very good Very good Very good Very good   In general, would you say your quality of life is: Very good Very good Very good Very good Very good Very good   In general, how would you rate your physical health? Very good Very good Very good Very good Very good Very good   In general, how would you rate your mental health, including your mood and your ability to think? Very good Very good Very good Very good Very good Very good   In general, how would you rate your satisfaction with your social activities and relationships? Good Good Very good Very good Very good Very good   In general, please rate how well you carry out your usual social activities and roles  Very good Very good Very good Very good Very good Very good   To what extent are you able to carry out your everyday physical activities such as walking, climbing stairs, carrying groceries, or moving a chair? Completely Completely Completely Completely Completely Completely   In the past 7 days, how often have you been bothered by emotional problems such as feeling anxious, depressed, or irritable? Sometimes Rarely Sometimes Rarely Rarely Rarely   In the past 7 days, how would you rate your fatigue on average? Moderate Mild Mild Mild None None   In the past 7 days, how would you rate your pain on average, where 0 means no pain, and 10 means worst imaginable pain? 1 0 0 0 0 1   In general, would you say your health is: 4 5 4 4 4 4   In general, would you say your quality of life is: 4 4 4 4 4 4   In general, how would you rate your physical health? 4 4 4 4 4 4   In general, how would you rate your mental health, including your mood and your ability to think? 4 4 4 4 4 4   In general, how would you rate your satisfaction with your social activities and relationships? 3 3 4 4 4 4   In general, please rate how well you carry out your usual social activities and roles. (This includes activities at home, at work and in your community, and responsibilities as a parent, child, spouse, employee, friend, etc.) 4 4 4 4 4 4   To what extent are you able to carry out your everyday physical activities such as walking, climbing stairs, carrying groceries, or moving a chair? 5 5 5 5 5 5   In the past 7 days, how often have you been bothered by emotional problems such as feeling anxious, depressed, or irritable? 3 2 3 2 2 2   In the past 7 days, how would you rate your fatigue on average? 3 2 2 2 1 1   In the past 7 days, how would you rate your pain on average, where 0 means no pain, and 10 means worst imaginable pain? 1 0 0 0 0 1   Global Mental Health Score 14 15 15 16 16  16    Global Physical Health Score 16 18 18 18 19  18     PROMIS TOTAL - SUBSCORES 30 33 33 34 35  34        Patient-reported     Cassandra Suicide Severity Rating Scale (Lifetime/Recent)      11/14/2023     8:00 AM 11/14/2023     1:00 PM   Cassandra Suicide Severity Rating (Lifetime/Recent)   Q1 Wish to be Dead (Lifetime)  No   Q2 Non-Specific Active Suicidal Thoughts (Lifetime)  No   Q1 Wished to be Dead (Past Month) no  no    Q2 Suicidal Thoughts (Past Month) no  no    Q6 Suicide Behavior (Lifetime) no  no    Actual Attempt (Lifetime)  N   Has subject engaged in non-suicidal self-injurious behavior? (Lifetime)  N   Interrupted Attempts (Lifetime)  N   Aborted or Self-Interrupted Attempt (Lifetime)  N   Preparatory Acts or Behavior (Lifetime)  N   Calculated C-SSRS Risk Score (Lifetime/Recent)  No Risk Indicated       Data saved with a previous flowsheet row definition        Referral:   Patient was referred to [facility] by self.    Reason for referral: clarify behavioral health diagnosis.      [facility] introduced self and role. Discussed informed consent and limits to confidentiality.     Presenting Concerns/ Current Stressors:   [facility] informed pt of departure. Pt and therapist discussed external agencies for couples counseling. Provided list of stress reduction techniques and websites for charu.    Therapeutic Interventions:  Motivational Interviewing (MI): Expressed respect for patient's autonomy in decision making.  Asked open-ended questions to invite patient's self-reflection and self-direction around change and what is important for them in working towards their goals.     Response to treatment interventions:   Patient was receptive to interventions utilized.     Safety Issues and Plan for Safety and Risk Management:     Patient denies a history of suicidal ideation, suicide attempts, self-injurious behavior, homicidal ideation, homicidal behavior, and and other safety concerns   Patient denies current fears or concerns for personal safety.   Patient denies current or recent  suicidal ideation or behaviors.   Patient denies current or recent homicidal ideation or behaviors.   Patient denies current or recent self injurious behavior or ideation.   Patient denies other safety concerns.   Recommended that patient call 911 or go to the local ED should there be a change in any of these risk factors   Patient reports there are no firearms in the house.       ASSESSMENT:   Mental Status:     Appearance:   Appropriate    Eye Contact:   Good    Psychomotor Behavior: Normal    Attitude:   Cooperative    Orientation:   All   Speech Rate / Production: Normal/ Responsive   Volume:   Normal    Mood:    Normal   Affect:    Appropriate    Thought Content:  Clear    Thought Form:  Coherent    Insight:    Fair         Diagnostic Criteria:   Adjustment Disorder  A. The development of emotional or behavioral symptoms in response to an identifiable stressor(s) occurring within 3 months of the onset of the stressor(s)  B. These symptoms or behaviors are clinically significant, as evidenced by one or both of the following:       - Marked distress that is out of proportion to the severity/intensity of the stressor (with consideration for external context & culture)       - Significant impairment in social, occupational, or other important areas of functioning  C. The stress-related disturbance does not meet criteria for another disorder & is not not an exacerbation of another mental disorder  D. The symptoms do not represent normal bereavement  E. Once the stressor or its consequences have terminated, the symptoms do not persist for more than an additional 6 months       * Adjustment Disorder with Anxiety: The predominant manfestations are symptoms such as nervousness, worry, or jitteriness, or, in children separation anxiety from major attachment figures        DSM5 Diagnoses: (Sustained by DSM5 Criteria Listed Above)     Diagnoses: Adjustment Disorders  309.24 (F43.22) With anxiety     Psychosocial /  Contextual Factors: Relationship Concerns       Collateral Reports Completed:   Not Applicable        PLAN: (Homework, other):     1. Patient was provided:  recommendation to follow through on referrals     2. Provider recommended the following referrals: .        3. Suicide Risk and Safety Concerns were assessed for Yordan Enamorado    Safety Plan:   Patient denied any current/recent/lifetime history of suicidal ideation and/or behaviors. Recommended that patient call 911 or go to the local ED should there be a change in any of these risk factors       Elaine Doty, [facility], [facility]   June 4, 2025    admission

## 2025-06-05 ENCOUNTER — HOSPITAL ENCOUNTER (OUTPATIENT)
Dept: MAMMOGRAPHY | Facility: CLINIC | Age: 70
End: 2025-06-05
Attending: NURSE PRACTITIONER
Payer: MEDICARE

## 2025-06-05 DIAGNOSIS — Z12.31 VISIT FOR SCREENING MAMMOGRAM: ICD-10-CM

## 2025-06-05 PROCEDURE — 77067 SCR MAMMO BI INCL CAD: CPT

## 2025-06-20 PROBLEM — F43.22 ADJUSTMENT DISORDER WITH ANXIOUS MOOD: Status: ACTIVE | Noted: 2025-06-20

## 2025-06-23 ENCOUNTER — TELEPHONE (OUTPATIENT)
Dept: OTOLARYNGOLOGY | Facility: CLINIC | Age: 70
End: 2025-06-23
Payer: MEDICARE

## 2025-06-25 ENCOUNTER — LAB (OUTPATIENT)
Dept: LAB | Facility: CLINIC | Age: 70
End: 2025-06-25
Payer: MEDICARE

## 2025-06-25 DIAGNOSIS — E78.2 MIXED HYPERLIPIDEMIA: ICD-10-CM

## 2025-06-25 DIAGNOSIS — R73.09 ELEVATED GLUCOSE: ICD-10-CM

## 2025-06-25 LAB
ANION GAP SERPL CALCULATED.3IONS-SCNC: 12 MMOL/L (ref 7–15)
BUN SERPL-MCNC: 14.4 MG/DL (ref 8–23)
CALCIUM SERPL-MCNC: 9.8 MG/DL (ref 8.8–10.4)
CHLORIDE SERPL-SCNC: 104 MMOL/L (ref 98–107)
CHOLEST SERPL-MCNC: 218 MG/DL
CREAT SERPL-MCNC: 0.88 MG/DL (ref 0.51–0.95)
EGFRCR SERPLBLD CKD-EPI 2021: 71 ML/MIN/1.73M2
EST. AVERAGE GLUCOSE BLD GHB EST-MCNC: 120 MG/DL
FASTING STATUS PATIENT QL REPORTED: YES
FASTING STATUS PATIENT QL REPORTED: YES
GLUCOSE SERPL-MCNC: 97 MG/DL (ref 70–99)
HBA1C MFR BLD: 5.8 % (ref 0–5.6)
HCO3 SERPL-SCNC: 26 MMOL/L (ref 22–29)
HDLC SERPL-MCNC: 72 MG/DL
LDLC SERPL CALC-MCNC: 131 MG/DL
NONHDLC SERPL-MCNC: 146 MG/DL
POTASSIUM SERPL-SCNC: 5.2 MMOL/L (ref 3.4–5.3)
SODIUM SERPL-SCNC: 142 MMOL/L (ref 135–145)
TRIGL SERPL-MCNC: 77 MG/DL

## 2025-06-25 PROCEDURE — 83036 HEMOGLOBIN GLYCOSYLATED A1C: CPT

## 2025-06-25 PROCEDURE — 36415 COLL VENOUS BLD VENIPUNCTURE: CPT

## 2025-06-25 PROCEDURE — 80048 BASIC METABOLIC PNL TOTAL CA: CPT

## 2025-06-25 PROCEDURE — 80061 LIPID PANEL: CPT

## 2025-06-26 ENCOUNTER — RESULTS FOLLOW-UP (OUTPATIENT)
Dept: FAMILY MEDICINE | Facility: CLINIC | Age: 70
End: 2025-06-26

## 2025-07-02 ENCOUNTER — OFFICE VISIT (OUTPATIENT)
Dept: DERMATOLOGY | Facility: CLINIC | Age: 70
End: 2025-07-02
Payer: MEDICARE

## 2025-07-02 DIAGNOSIS — D22.9 MULTIPLE BENIGN NEVI: ICD-10-CM

## 2025-07-02 DIAGNOSIS — L81.4 LENTIGINES: ICD-10-CM

## 2025-07-02 DIAGNOSIS — L82.0 INFLAMED SEBORRHEIC KERATOSIS: ICD-10-CM

## 2025-07-02 DIAGNOSIS — Z86.006 HISTORY OF MELANOMA IN SITU: Primary | ICD-10-CM

## 2025-07-02 NOTE — PROGRESS NOTES
[facility] Dermatology Note    Encounter Date: Jul 2, 2025    Dermatology Problem List:  #Hx LIZZETTE  - L buttock 2019    Major PMHx  -     Social Hx:  ______________________________________    Impression/Plan:  Yordan was seen today for derm problem.    Diagnoses and all orders for this visit:    History of melanoma in situ  - No obvious evidence of recurrence at site of previous malignancy    Multiple benign nevi  Lentigines  - Reviewed the compounding benefits of incremental changes to sun protective behaviors including increased frequency of sunscreen and sun protective clothing like broad brimmed hats and longsleeved UPF containing clothing      Inflamed seborrheic keratosis  -     DESTRUCT BENIGN LESION, UP TO 14  - see procedure note        Cryotherapy procedure note: After verbal consent and discussion of risks and benefits including but no limited to dyspigmentation/scar, blister, and pain, 7 ISKs on face was(were) treated with 1-2mm freeze border for 2 cycles with liquid nitrogen. Post cryotherapy instructions were provided.     Follow-up in 1 year.       Staff Involved:  Staff Only    Daniel Barrett MD   of Dermatology  Department of Dermatology  [facility]      CC:   Chief Complaint   Patient presents with    Derm Problem     FBSE        History of Present Illness:  Ms. Yordan Enamorado is a 69 year old female who presents as a return patient.    Last seen 2024.     Pt presents today for concerns about spots on trunk and extremities       Labs:      Physical exam:  Vitals: LMP  (LMP Unknown)   GEN: well developed, well-nourished, in no acute distress, in a pleasant mood.     SKIN: Carroll phototype 1  - Full skin, which includes the head/face, both arms, chest, back, abdomen,both legs, genitalia and/or groin buttocks, digits and/or nails, was examined.  - Flat brown macules and patches in a sun exposed areas on face and extremities  -  scattered brown papules on trunk and extremities   - No other lesions of concern on areas examined.     Past Medical History:   Past Medical History:   Diagnosis Date    Arthritis     Diabetes (H)     Melanoma of skin (H)     left buttocks     Past Surgical History:   Procedure Laterality Date    BREAST EXCISIONAL BIOPSY      all benign    BREAST SURGERY      COLONOSCOPY      SKIN CANCER EXCISION Left 2019    melanoma       Social History:   reports that she has never smoked. She has been exposed to tobacco smoke. She has never used smokeless tobacco. She reports that she does not currently use alcohol. She reports that she does not use drugs.    Family History:  Family History   Problem Relation Age of Onset    Lymphoma Mother         Lymph nodes    Peripheral Vascular Disease Father         tobacco related death    Pulmonary Embolism Sister     Myocardial Infarction Sister         tobacco related    Other - See Comments Brother          in [place] war    Alcoholism Brother     Arthritis Brother     Alcoholism Brother     Diabetes Paternal Grandfather     No Known Problems Daughter     Substance Abuse Daughter     No Known Problems Son     Breast Cancer Maternal Aunt     Other Cancer Other         paternal aunt    Melanoma No family hx of     Glaucoma No family hx of     Macular Degeneration No family hx of        Medications:  Current Outpatient Medications   Medication Sig Dispense Refill    vitamin B complex with vitamin C (STRESS TAB) tablet Take 1 tablet by mouth daily      vitamin D3 (CHOLECALCIFEROL) 50 mcg (2000 units) tablet Take 1 tablet by mouth daily      VITAMIN E PO Take by mouth daily      Zinc Sulfate (ZINC 15 PO)        Allergies   Allergen Reactions    Demerol [Meperidine] Unknown     Pet patient- she was told by nurse that she might have an allergy to demerol. This was noted after a colonoscopy she had done.

## 2025-07-02 NOTE — LETTER
7/2/2025      Yordan Enamorado  [address]  [place] [address]      Dear Colleague,    Thank you for referring your patient, Yordan Enamorado, to the [facility]. Please see a copy of my visit note below.    [facility] Dermatology Note    Encounter Date: Jul 2, 2025    Dermatology Problem List:  #Hx MIS  - L buttock 2019    Major PMHx  -     Social Hx:  ______________________________________    Impression/Plan:  Yordan was seen today for derm problem.    Diagnoses and all orders for this visit:    History of melanoma in situ  - No obvious evidence of recurrence at site of previous malignancy    Multiple benign nevi  Lentigines  - Reviewed the compounding benefits of incremental changes to sun protective behaviors including increased frequency of sunscreen and sun protective clothing like broad brimmed hats and longsleeved UPF containing clothing      Inflamed seborrheic keratosis  -     DESTRUCT BENIGN LESION, UP TO 14  - see procedure note        Cryotherapy procedure note: After verbal consent and discussion of risks and benefits including but no limited to dyspigmentation/scar, blister, and pain, 7 ISKs on face was(were) treated with 1-2mm freeze border for 2 cycles with liquid nitrogen. Post cryotherapy instructions were provided.     Follow-up in 1 year.       Staff Involved:  Staff Only    Daniel Barrett MD   of Dermatology  Department of Dermatology  [facility]      CC:   Chief Complaint   Patient presents with     Derm Problem     FBSE        History of Present Illness:  Ms. Yordan Enamorado is a 69 year old female who presents as a return patient.    Last seen 2024.     Pt presents today for concerns about spots on trunk and extremities       Labs:      Physical exam:  Vitals: LMP  (LMP Unknown)   GEN: well developed, well-nourished, in no acute distress, in a pleasant mood.     SKIN: Carroll  phototype 1  - Full skin, which includes the head/face, both arms, chest, back, abdomen,both legs, genitalia and/or groin buttocks, digits and/or nails, was examined.  - Flat brown macules and patches in a sun exposed areas on face and extremities  - scattered brown papules on trunk and extremities   - No other lesions of concern on areas examined.     Past Medical History:   Past Medical History:   Diagnosis Date     Arthritis      Diabetes (H)      Melanoma of skin (H)     left buttocks     Past Surgical History:   Procedure Laterality Date     BREAST EXCISIONAL BIOPSY      all benign     BREAST SURGERY       COLONOSCOPY       SKIN CANCER EXCISION Left 2019    melanoma       Social History:   reports that she has never smoked. She has been exposed to tobacco smoke. She has never used smokeless tobacco. She reports that she does not currently use alcohol. She reports that she does not use drugs.    Family History:  Family History   Problem Relation Age of Onset     Lymphoma Mother         Lymph nodes     Peripheral Vascular Disease Father         tobacco related death     Pulmonary Embolism Sister      Myocardial Infarction Sister         tobacco related     Other - See Comments Brother          in [place] war     Alcoholism Brother      Arthritis Brother      Alcoholism Brother      Diabetes Paternal Grandfather      No Known Problems Daughter      Substance Abuse Daughter      No Known Problems Son      Breast Cancer Maternal Aunt      Other Cancer Other         paternal aunt     Melanoma No family hx of      Glaucoma No family hx of      Macular Degeneration No family hx of        Medications:  Current Outpatient Medications   Medication Sig Dispense Refill     vitamin B complex with vitamin C (STRESS TAB) tablet Take 1 tablet by mouth daily       vitamin D3 (CHOLECALCIFEROL) 50 mcg (2000 units) tablet Take 1 tablet by mouth daily       VITAMIN E PO Take by mouth daily       Zinc Sulfate (ZINC 15 PO)         Allergies   Allergen Reactions     Demerol [Meperidine] Unknown     Pet patient- she was told by nurse that she might have an allergy to demerol. This was noted after a colonoscopy she had done.               Again, thank you for allowing me to participate in the care of your patient.        Sincerely,        Daniel Barrett MD    Electronically signed

## 2025-07-03 ENCOUNTER — MYC MEDICAL ADVICE (OUTPATIENT)
Dept: VASCULAR SURGERY | Facility: CLINIC | Age: 70
End: 2025-07-03

## 2025-07-07 ENCOUNTER — OFFICE VISIT (OUTPATIENT)
Dept: FAMILY MEDICINE | Facility: CLINIC | Age: 70
End: 2025-07-07
Payer: MEDICARE

## 2025-07-07 VITALS
HEIGHT: 66 IN | RESPIRATION RATE: 20 BRPM | TEMPERATURE: 98.1 F | HEART RATE: 61 BPM | DIASTOLIC BLOOD PRESSURE: 60 MMHG | BODY MASS INDEX: 21.38 KG/M2 | OXYGEN SATURATION: 99 % | SYSTOLIC BLOOD PRESSURE: 120 MMHG | WEIGHT: 133 LBS

## 2025-07-07 DIAGNOSIS — R10.11 ABDOMINAL PAIN, RIGHT UPPER QUADRANT: Primary | ICD-10-CM

## 2025-07-07 LAB
BASOPHILS # BLD AUTO: 0 10E3/UL (ref 0–0.2)
BASOPHILS NFR BLD AUTO: 0 %
EOSINOPHIL # BLD AUTO: 0.1 10E3/UL (ref 0–0.7)
EOSINOPHIL NFR BLD AUTO: 2 %
ERYTHROCYTE [DISTWIDTH] IN BLOOD BY AUTOMATED COUNT: 12.1 % (ref 10–15)
HCT VFR BLD AUTO: 37.9 % (ref 35–47)
HGB BLD-MCNC: 12 G/DL (ref 11.7–15.7)
IMM GRANULOCYTES # BLD: 0.1 10E3/UL
IMM GRANULOCYTES NFR BLD: 1 %
LYMPHOCYTES # BLD AUTO: 1 10E3/UL (ref 0.8–5.3)
LYMPHOCYTES NFR BLD AUTO: 15 %
MCH RBC QN AUTO: 30.2 PG (ref 26.5–33)
MCHC RBC AUTO-ENTMCNC: 31.7 G/DL (ref 31.5–36.5)
MCV RBC AUTO: 96 FL (ref 78–100)
MONOCYTES # BLD AUTO: 0.6 10E3/UL (ref 0–1.3)
MONOCYTES NFR BLD AUTO: 10 %
NEUTROPHILS # BLD AUTO: 4.9 10E3/UL (ref 1.6–8.3)
NEUTROPHILS NFR BLD AUTO: 73 %
PLATELET # BLD AUTO: 272 10E3/UL (ref 150–450)
RBC # BLD AUTO: 3.97 10E6/UL (ref 3.8–5.2)
WBC # BLD AUTO: 6.7 10E3/UL (ref 4–11)

## 2025-07-07 PROCEDURE — 3074F SYST BP LT 130 MM HG: CPT | Performed by: FAMILY MEDICINE

## 2025-07-07 PROCEDURE — 83690 ASSAY OF LIPASE: CPT | Performed by: FAMILY MEDICINE

## 2025-07-07 PROCEDURE — 1125F AMNT PAIN NOTED PAIN PRSNT: CPT | Performed by: FAMILY MEDICINE

## 2025-07-07 PROCEDURE — 82150 ASSAY OF AMYLASE: CPT | Performed by: FAMILY MEDICINE

## 2025-07-07 PROCEDURE — 99214 OFFICE O/P EST MOD 30 MIN: CPT | Performed by: FAMILY MEDICINE

## 2025-07-07 PROCEDURE — 36415 COLL VENOUS BLD VENIPUNCTURE: CPT | Performed by: FAMILY MEDICINE

## 2025-07-07 PROCEDURE — 3078F DIAST BP <80 MM HG: CPT | Performed by: FAMILY MEDICINE

## 2025-07-07 PROCEDURE — 85025 COMPLETE CBC W/AUTO DIFF WBC: CPT | Performed by: FAMILY MEDICINE

## 2025-07-07 PROCEDURE — 80053 COMPREHEN METABOLIC PANEL: CPT | Performed by: FAMILY MEDICINE

## 2025-07-07 ASSESSMENT — PAIN SCALES - GENERAL: PAINLEVEL_OUTOF10: MODERATE PAIN (6)

## 2025-07-07 NOTE — PROGRESS NOTES
"  Assessment & Plan     (R10.11) Abdominal pain, right upper quadrant  (primary encounter diagnosis)  Comment: pt has abd pain on RUQ x 3 days. Lay down pain is much worse: moderate to severe , so during the day the pain is better: mild. Dull pain, no radiation. Denies n/v/d/c, fever, dysuria, injury. No history of abd surgery. Normal bm and last bm was today. She tried motrin and not much help. Before the pain started no alcohol, and diet change.   Exam: mild tenderness on RUQ. Normal cbc with diff  Ddx: gallbladder stone, gastritis, pancreatitis ertc.   Plan: CBC with platelets and differential,         Comprehensive metabolic panel (BMP + Alb, Alk         Phos, ALT, AST, Total. Bili, TP), Lipase,         Amylase, US Abdomen Complete, CANCELED: US         Abdomen Complete        Will follow-up lab and abd us. Strongly advise rest and hydration. Avoid alcohol, grease food. Advise brat diet as tolerated. If pain gets worse she needs to go to er. Follow-up prn.     Follow-up   No follow-ups on file.        Al Farr is a 69 year old, presenting for the following health issues:  Abdominal Pain (X 3 days URQ stomach pain, bloated, lack of appetitive )      7/7/2025     3:07 PM   Additional Questions   Roomed by Gen CATHY CMA   Accompanied by spouse     History of Present Illness       Reason for visit:  Pains in my abdomen  Symptoms include:  Moderate to  severe pains  Symptom intensity:  Severe  Symptom progression:  Worsening  Had these symptoms before:  No  What makes it worse:  Lying down  What makes it better:  Walking   She is taking medications regularly.           Review of Systems  Constitutional, HEENT, cardiovascular, pulmonary, gi and gu systems are negative, except as otherwise noted.      Objective    /60 (BP Location: Left arm, Patient Position: Sitting, Cuff Size: Adult Regular)   Pulse 61   Temp 98.1  F (36.7  C) (Oral)   Resp 20   Ht 1.676 m (5' 6\")   Wt 60.3 kg (133 lb)   LMP  " (LMP Unknown)   SpO2 99%   BMI 21.47 kg/m    Body mass index is 21.47 kg/m .  Physical Exam   GENERAL: alert and no distress  NECK: no adenopathy, no asymmetry, masses, or scars  RESP: lungs clear to auscultation - no rales, rhonchi or wheezes  CV: regular rate and rhythm, normal S1 S2, no S3 or S4, no murmur, click or rub, no peripheral edema  ABDOMEN: soft,   no hepatosplenomegaly, no masses and bowel sounds normal. Mild tenderness at RUQ.   MS: no gross musculoskeletal defects noted, no edema           Signed Electronically by: Kait Cobb MD

## 2025-07-08 LAB
ALBUMIN SERPL BCG-MCNC: 3.9 G/DL (ref 3.5–5.2)
ALP SERPL-CCNC: 117 U/L (ref 40–150)
ALT SERPL W P-5'-P-CCNC: 61 U/L (ref 0–50)
AMYLASE SERPL-CCNC: 107 U/L (ref 28–100)
ANION GAP SERPL CALCULATED.3IONS-SCNC: 10 MMOL/L (ref 7–15)
AST SERPL W P-5'-P-CCNC: 31 U/L (ref 0–45)
BILIRUB SERPL-MCNC: <0.2 MG/DL
BUN SERPL-MCNC: 16.1 MG/DL (ref 8–23)
CALCIUM SERPL-MCNC: 9.3 MG/DL (ref 8.8–10.4)
CHLORIDE SERPL-SCNC: 100 MMOL/L (ref 98–107)
CREAT SERPL-MCNC: 0.77 MG/DL (ref 0.51–0.95)
EGFRCR SERPLBLD CKD-EPI 2021: 83 ML/MIN/1.73M2
GLUCOSE SERPL-MCNC: 100 MG/DL (ref 70–99)
HCO3 SERPL-SCNC: 27 MMOL/L (ref 22–29)
LIPASE SERPL-CCNC: 41 U/L (ref 13–60)
POTASSIUM SERPL-SCNC: 4.5 MMOL/L (ref 3.4–5.3)
PROT SERPL-MCNC: 7.3 G/DL (ref 6.4–8.3)
SODIUM SERPL-SCNC: 137 MMOL/L (ref 135–145)

## 2025-07-09 ENCOUNTER — HOSPITAL ENCOUNTER (OUTPATIENT)
Dept: ULTRASOUND IMAGING | Facility: CLINIC | Age: 70
Discharge: HOME | End: 2025-07-09
Attending: FAMILY MEDICINE
Payer: MEDICARE

## 2025-07-09 ENCOUNTER — RESULTS FOLLOW-UP (OUTPATIENT)
Dept: FAMILY MEDICINE | Facility: CLINIC | Age: 70
End: 2025-07-09
Payer: MEDICARE

## 2025-07-09 DIAGNOSIS — R10.11 ABDOMINAL PAIN, RIGHT UPPER QUADRANT: ICD-10-CM

## 2025-07-09 DIAGNOSIS — R16.0 LIVER MASS: Primary | ICD-10-CM

## 2025-07-09 PROCEDURE — 76705 ECHO EXAM OF ABDOMEN: CPT

## 2025-07-10 NOTE — TELEPHONE ENCOUNTER
Spoke with the patient to relay the provider's message. She verbalized understanding and already set up an appointment for her CT.    The patient states in the future if she ever doesn't answer a Memamphart message is a good way to contact her.     Eleanor Covarrubias RN  [facility]

## 2025-07-11 ENCOUNTER — HOSPITAL ENCOUNTER (OUTPATIENT)
Dept: CT IMAGING | Facility: CLINIC | Age: 70
Discharge: HOME | End: 2025-07-11
Attending: FAMILY MEDICINE | Admitting: FAMILY MEDICINE
Payer: MEDICARE

## 2025-07-11 DIAGNOSIS — R16.0 LIVER MASS: ICD-10-CM

## 2025-07-11 PROCEDURE — 74177 CT ABD & PELVIS W/CONTRAST: CPT

## 2025-07-11 PROCEDURE — 250N000011 HC RX IP 250 OP 636: Performed by: FAMILY MEDICINE

## 2025-07-11 RX ORDER — IOPAMIDOL 755 MG/ML
90 INJECTION, SOLUTION INTRAVASCULAR ONCE
Status: COMPLETED | OUTPATIENT
Start: 2025-07-11 | End: 2025-07-11

## 2025-07-11 RX ADMIN — IOPAMIDOL 90 ML: 755 INJECTION, SOLUTION INTRAVENOUS at 14:14

## 2025-08-11 ENCOUNTER — OFFICE VISIT (OUTPATIENT)
Dept: OTOLARYNGOLOGY | Facility: CLINIC | Age: 70
End: 2025-08-11
Attending: NURSE PRACTITIONER
Payer: MEDICARE

## 2025-08-11 DIAGNOSIS — D10.30 FIBROMA OF MOUTH: Primary | ICD-10-CM

## 2025-08-11 PROCEDURE — 99213 OFFICE O/P EST LOW 20 MIN: CPT | Performed by: OTOLARYNGOLOGY
